# Patient Record
Sex: MALE | Race: WHITE | NOT HISPANIC OR LATINO | URBAN - METROPOLITAN AREA
[De-identification: names, ages, dates, MRNs, and addresses within clinical notes are randomized per-mention and may not be internally consistent; named-entity substitution may affect disease eponyms.]

---

## 2023-10-13 RX ORDER — DIVALPROEX SODIUM 500 MG/1
500 TABLET, DELAYED RELEASE ORAL
Refills: 0 | Status: DISCONTINUED | OUTPATIENT
Start: 2023-10-14 | End: 2023-10-25

## 2023-10-13 RX ORDER — QUETIAPINE FUMARATE 200 MG/1
200 TABLET, FILM COATED ORAL AT BEDTIME
Refills: 0 | Status: DISCONTINUED | OUTPATIENT
Start: 2023-10-14 | End: 2023-11-01

## 2023-10-13 RX ORDER — BENZTROPINE MESYLATE 1 MG
1 TABLET ORAL
Refills: 0 | Status: DISCONTINUED | OUTPATIENT
Start: 2023-10-14 | End: 2023-11-01

## 2023-10-13 RX ORDER — QUETIAPINE FUMARATE 200 MG/1
25 TABLET, FILM COATED ORAL EVERY 6 HOURS
Refills: 0 | Status: DISCONTINUED | OUTPATIENT
Start: 2023-10-14 | End: 2023-10-16

## 2023-10-13 RX ORDER — HALOPERIDOL DECANOATE 100 MG/ML
5 INJECTION INTRAMUSCULAR EVERY 6 HOURS
Refills: 0 | Status: DISCONTINUED | OUTPATIENT
Start: 2023-10-14 | End: 2023-11-01

## 2023-10-14 ENCOUNTER — INPATIENT (INPATIENT)
Facility: HOSPITAL | Age: 53
LOS: 17 days | Discharge: HOME | DRG: 885 | End: 2023-11-01
Attending: PSYCHIATRY & NEUROLOGY | Admitting: PSYCHIATRY & NEUROLOGY
Payer: MEDICARE

## 2023-10-14 VITALS
OXYGEN SATURATION: 96 % | RESPIRATION RATE: 18 BRPM | TEMPERATURE: 98 F | WEIGHT: 207.9 LBS | SYSTOLIC BLOOD PRESSURE: 131 MMHG | HEART RATE: 76 BPM | DIASTOLIC BLOOD PRESSURE: 77 MMHG | HEIGHT: 71 IN

## 2023-10-14 PROCEDURE — 90792 PSYCH DIAG EVAL W/MED SRVCS: CPT

## 2023-10-14 RX ORDER — NICOTINE POLACRILEX 2 MG
1 GUM BUCCAL DAILY
Refills: 0 | Status: DISCONTINUED | OUTPATIENT
Start: 2023-10-14 | End: 2023-11-01

## 2023-10-14 RX ADMIN — QUETIAPINE FUMARATE 200 MILLIGRAM(S): 200 TABLET, FILM COATED ORAL at 21:20

## 2023-10-14 RX ADMIN — DIVALPROEX SODIUM 500 MILLIGRAM(S): 500 TABLET, DELAYED RELEASE ORAL at 21:20

## 2023-10-14 RX ADMIN — Medication 1 MILLIGRAM(S): at 11:00

## 2023-10-14 RX ADMIN — Medication 1 MILLIGRAM(S): at 21:20

## 2023-10-14 NOTE — BH PATIENT PROFILE - HOME MEDICATIONS
No Rx/Hx Recorded valproic acid 250 mg oral capsule , 3 cap(s) orally 2 times a day  sodium chloride 1 g oral tablet , 1 tab(s) orally 2 times a day  QUEtiapine 200 mg oral tablet , 1 tab(s) orally once a day (at bedtime)  benztropine 1 mg oral tablet , 1 tab(s) orally 2 times a day

## 2023-10-14 NOTE — BH PATIENT PROFILE - FALL HARM RISK - UNIVERSAL INTERVENTIONS
Bed in lowest position, wheels locked, appropriate side rails in place/Call bell, personal items and telephone in reach/Instruct patient to call for assistance before getting out of bed or chair/Non-slip footwear when patient is out of bed/[place] to call system/Physically safe environment - no spills, clutter or unnecessary equipment/Purposeful Proactive Rounding/Room/bathroom lighting operational, light cord in reach

## 2023-10-14 NOTE — BH INPATIENT PSYCHIATRY ASSESSMENT NOTE - NSBHMETABOLIC_PSY_ALL_CORE_FT
BMI: BMI (kg/m2): 29 (10-14-23 @ 04:31)  HbA1c:   Glucose:   BP: 122/77 (10-14-23 @ 09:00) (122/77 - 131/77)  Lipid Panel:  BMI: BMI (kg/m2): 29 (10-14-23 @ 04:31)  HbA1c:   Glucose:   BP: 130/87 (10-14-23 @ 17:23) (122/77 - 131/77)  Lipid Panel:

## 2023-10-14 NOTE — BH INPATIENT PSYCHIATRY ASSESSMENT NOTE - NSBHCHARTREVIEWVS_PSY_A_CORE FT
Vital Signs Last 24 Hrs  T(C): 37.2 (10-14-23 @ 09:00), Max: 37.2 (10-14-23 @ 09:00)  T(F): 98.9 (10-14-23 @ 09:00), Max: 98.9 (10-14-23 @ 09:00)  HR: 65 (10-14-23 @ 09:00) (65 - 76)  BP: 122/77 (10-14-23 @ 09:00) (122/77 - 131/77)  BP(mean): --  RR: 17 (10-14-23 @ 09:00) (17 - 18)  SpO2: 96% (10-14-23 @ 09:00) (96% - 96%)     Vital Signs Last 24 Hrs  T(C): 37.2 (10-14-23 @ 17:23), Max: 37.2 (10-14-23 @ 09:00)  T(F): 98.9 (10-14-23 @ 17:23), Max: 98.9 (10-14-23 @ 09:00)  HR: 65 (10-14-23 @ 17:23) (65 - 76)  BP: 130/87 (10-14-23 @ 17:23) (122/77 - 131/77)  BP(mean): --  RR: 18 (10-14-23 @ 17:23) (17 - 18)  SpO2: 96% (10-14-23 @ 17:23) (96% - 96%)

## 2023-10-14 NOTE — BH INPATIENT PSYCHIATRY ASSESSMENT NOTE - NSBHPHYSICALEXAM_PSY_ALL_CORE
declined to do a physical exam, an explanation was provided on the importance of a physical exam and that if he gives consent another one can be done.

## 2023-10-14 NOTE — ED BEHAVIORAL HEALTH NOTE - BEHAVIORAL HEALTH NOTE
Patient arrived safety to [facility], was assessed on telepsych platform, denies any complaints at this time. Orders were placed by admitting team.

## 2023-10-14 NOTE — BH INPATIENT PSYCHIATRY ASSESSMENT NOTE - HPI (INCLUDE ILLNESS QUALITY, SEVERITY, DURATION, TIMING, CONTEXT, MODIFYING FACTORS, ASSOCIATED SIGNS AND SYMPTOMS)
52 yo male, domiciled at residence, followed by [facility], with PPH of schizoaffective disorder on prolixin 62.5 mg given on 10/09 per the ED collateral at [facility]. He was brought to the ED after being found going through maillboxes and demonstrating disorganized behavior out in the community. He was admitted due to decompensation. On assessment today, the patient was notably paranoid, frequently stating that he wanted to "keep those things private." He expressed that he felt the hospital was "poisoning the food in the morning." He reported having a "good appetite and good mood." He declined to comment on any other mood symptoms and also refused to mention how much sleep he has been getting recently. He denies any access to guns, denies any SI or HI. He reports smoking 2-3 cigars daily and denies all other substance use. He declined all other questions and reported mild side effects to haldol, artane, and inderal. It appears related to EPS and dizziness.

## 2023-10-14 NOTE — BH INPATIENT PSYCHIATRY ASSESSMENT NOTE - RISK ASSESSMENT
The patient was admitted due to disorganized behavior at his primary residence. He denies SI or HI and reports no history of SA. He denies any access to firearms.

## 2023-10-14 NOTE — BH INPATIENT PSYCHIATRY ASSESSMENT NOTE - CURRENT MEDICATION
MEDICATIONS  (STANDING):  benztropine 1 milliGRAM(s) Oral two times a day  divalproex  milliGRAM(s) Oral two times a day  nicotine -  14 mG/24Hr(s) Patch 1 Patch Transdermal daily  QUEtiapine 200 milliGRAM(s) Oral at bedtime    MEDICATIONS  (PRN):  haloperidol     Tablet 5 milliGRAM(s) Oral every 6 hours PRN agitation  LORazepam     Tablet 1 milliGRAM(s) Oral every 6 hours PRN Agitation  QUEtiapine 25 milliGRAM(s) Oral every 6 hours PRN agitation

## 2023-10-14 NOTE — BH INPATIENT PSYCHIATRY ASSESSMENT NOTE - NSBHASSESSSUMMFT_PSY_ALL_CORE
54 yo male, domiciled at residence, [facility] by [facility], with PPH of schizoaffective disorder on prolixin 62.5 mg given on 10/09 per the ED collateral at [facility]. He was brought to the ED after being found going through maillboxes and demonstrating disorganized behavior out in the community. 52 yo male, domiciled at residence, followed by [facility], with PPH of schizoaffective disorder on prolixin 62.5 mg given on 10/09 per the ED collateral at [facility]. He was brought to the ED after being found going through maillboxes and demonstrating disorganized behavior out in the community. On assessment today, notable symptoms included paranoia, delusional content of having his food poisoned, and mild agitation. He denies SI or HI. He declined to provide many details on assessment. He reports no other acute complaints at this time. He has not been a behavioral issue on the unit with no aggressive behaviors.     Plan:  legal status 9.27  - routine observation  - depakote  mg BID  - seroquel 200 mg nightly  - nicotine patch 14 mg   - zyprexa 5 mg PO/IM q6 hours prn for acute agitation   -

## 2023-10-15 PROCEDURE — 99231 SBSQ HOSP IP/OBS SF/LOW 25: CPT

## 2023-10-15 RX ORDER — NICOTINE POLACRILEX 2 MG
2 GUM BUCCAL
Refills: 0 | Status: DISCONTINUED | OUTPATIENT
Start: 2023-10-15 | End: 2023-11-01

## 2023-10-15 RX ADMIN — QUETIAPINE FUMARATE 200 MILLIGRAM(S): 200 TABLET, FILM COATED ORAL at 21:47

## 2023-10-15 RX ADMIN — Medication 1 MILLIGRAM(S): at 21:47

## 2023-10-15 RX ADMIN — DIVALPROEX SODIUM 500 MILLIGRAM(S): 500 TABLET, DELAYED RELEASE ORAL at 21:47

## 2023-10-15 RX ADMIN — Medication 1 MILLIGRAM(S): at 10:01

## 2023-10-15 RX ADMIN — Medication 1 PATCH: at 10:01

## 2023-10-15 RX ADMIN — DIVALPROEX SODIUM 500 MILLIGRAM(S): 500 TABLET, DELAYED RELEASE ORAL at 10:01

## 2023-10-15 NOTE — BH INPATIENT PSYCHIATRY PROGRESS NOTE - NSBHATTESTBILLING_PSY_A_CORE
54256-Gqtnnsosqc OBS or IP - moderate complexity OR 35-49 mins 31779-Xrnprgiqkf OBS or IP - low complexity OR 25-34 mins

## 2023-10-15 NOTE — BH INPATIENT PSYCHIATRY PROGRESS NOTE - PRN MEDS
MEDICATIONS  (PRN):  haloperidol     Tablet 5 milliGRAM(s) Oral every 6 hours PRN agitation  LORazepam     Tablet 1 milliGRAM(s) Oral every 6 hours PRN Agitation  QUEtiapine 25 milliGRAM(s) Oral every 6 hours PRN agitation

## 2023-10-15 NOTE — BH INPATIENT PSYCHIATRY PROGRESS NOTE - NSBHASSESSSUMMFT_PSY_ALL_CORE
52 yo male, domiciled at residence, [facility], with PPH of schizoaffective disorder on prolixin 62.5 mg given on 10/09 per the ED collateral at [facility]. He was brought to the ED after being found going through maillboxes and demonstrating disorganized behavior out in the community. On assessment today, notable symptoms included paranoia, delusional content of having his food poisoned, and mild agitation. He denies SI or HI. He declined to provide many details on assessment. He reports no other acute complaints at this time. He has not been a behavioral issue on the unit with no aggressive behaviors.     Plan:  legal status 9.27  - routine observation  - depakote  mg BID  - seroquel 200 mg nightly  - nicotine patch 14 mg   - zyprexa 5 mg PO/IM q6 hours prn for acute agitation   -  52 yo male, domiciled at residence, followed by [facility], with PPH of schizoaffective disorder on prolixin 62.5 mg given on 10/09 per the ED collateral at [facility]. He was brought to the ED after being found going through maillboxes and demonstrating disorganized behavior out in the community. On assessment today, notable symptoms included intermittent auditory hallucinations and, yesterday, the patient reported paranoia and delusional content including the belief that his food was poisoned, as well as mild agitation. He persistently denies SI/HI, intent and plan. He declined to provide any details on further assessment. He reports no other acute complaints at this time. He has not been a behavioral issue on the unit with no aggressive behaviors.     Plan:  legal status 9.27  - routine observation  - depakote  mg BID  - seroquel 200 mg nightly  - nicotine patch 14 mg   - zyprexa 5 mg PO/IM q6 hours prn for acute agitation

## 2023-10-15 NOTE — BH INPATIENT PSYCHIATRY PROGRESS NOTE - NSBHMETABOLIC_PSY_ALL_CORE_FT
BMI: BMI (kg/m2): 29 (10-14-23 @ 04:31)  HbA1c:   Glucose:   BP: 130/87 (10-14-23 @ 17:23) (122/77 - 131/77)  Lipid Panel:  BMI: BMI (kg/m2): 29 (10-14-23 @ 04:31)  HbA1c:   Glucose:   BP: 143/97 (10-15-23 @ 16:58) (122/77 - 143/97)  Lipid Panel:

## 2023-10-15 NOTE — BH INPATIENT PSYCHIATRY PROGRESS NOTE - NSBHCHARTREVIEWVS_PSY_A_CORE FT
Vital Signs Last 24 Hrs  T(C): 37.2 (10-14-23 @ 17:23), Max: 37.2 (10-14-23 @ 09:00)  T(F): 98.9 (10-14-23 @ 17:23), Max: 98.9 (10-14-23 @ 09:00)  HR: 65 (10-14-23 @ 17:23) (65 - 65)  BP: 130/87 (10-14-23 @ 17:23) (122/77 - 130/87)  BP(mean): --  RR: 18 (10-14-23 @ 17:23) (17 - 18)  SpO2: 96% (10-14-23 @ 17:23) (96% - 96%)     Vital Signs Last 24 Hrs  T(C): 37.5 (10-15-23 @ 16:58), Max: 37.6 (10-15-23 @ 08:46)  T(F): 99.5 (10-15-23 @ 16:58), Max: 99.6 (10-15-23 @ 08:46)  HR: 72 (10-15-23 @ 16:58) (71 - 72)  BP: 143/97 (10-15-23 @ 16:58) (140/91 - 143/97)  BP(mean): --  RR: 16 (10-15-23 @ 16:58) (16 - 16)  SpO2: 99% (10-15-23 @ 16:58) (98% - 99%)

## 2023-10-15 NOTE — BH INPATIENT PSYCHIATRY PROGRESS NOTE - NSBHFUPINTERVALHXFT_PSY_A_CORE
Chart and nursing notes reviewed.  No acute events overnight, VSS.  The patient has been interacting appropriately with staff and peers and has been compliant with medications.  The patient continues to tolerate medications and denies any medication side effects.    On evaluation, the patient is calm, cooperative, demonstrating good behavioral control and linear thought processes.   Chart and nursing notes reviewed.  No acute events overnight, VSS.  The patient has been interacting appropriately with staff and peers and has been compliant with medications.  The patient continues to tolerate medications and denies any medication side effects.    On evaluation, the patient is calm, cooperative, oddly related with good eye contact, euthymic affect and demonstrating good behavioral control and linear thought processes.  The patient reports that he "feel[s] fine."  He inquires about obtaining a comb and his clothing and the patient is directed to nursing.  He requests a nicotine lozenge for NRT.  The patient denies SI/HI, intent, plan and AVH.  He reports intermittent AH "when they are trying to trouble him."  He reports that he last experienced AH this morning but he declines to elaborate.  The patient reports all needs have been met.  All questions and concerns addressed.

## 2023-10-16 PROCEDURE — 99233 SBSQ HOSP IP/OBS HIGH 50: CPT

## 2023-10-16 RX ADMIN — Medication 2 MILLIGRAM(S): at 12:38

## 2023-10-16 RX ADMIN — Medication 1 PATCH: at 06:42

## 2023-10-16 RX ADMIN — Medication 1 MILLIGRAM(S): at 10:12

## 2023-10-16 RX ADMIN — Medication 2 MILLIGRAM(S): at 10:14

## 2023-10-16 RX ADMIN — DIVALPROEX SODIUM 500 MILLIGRAM(S): 500 TABLET, DELAYED RELEASE ORAL at 21:24

## 2023-10-16 RX ADMIN — Medication 2 MILLIGRAM(S): at 17:27

## 2023-10-16 RX ADMIN — QUETIAPINE FUMARATE 200 MILLIGRAM(S): 200 TABLET, FILM COATED ORAL at 21:24

## 2023-10-16 RX ADMIN — Medication 1 MILLIGRAM(S): at 21:24

## 2023-10-16 RX ADMIN — DIVALPROEX SODIUM 500 MILLIGRAM(S): 500 TABLET, DELAYED RELEASE ORAL at 10:12

## 2023-10-16 NOTE — BH INPATIENT PSYCHIATRY PROGRESS NOTE - NSBHCHARTREVIEWVS_PSY_A_CORE FT
Vital Signs Last 24 Hrs  T(C): 36.7 (10-16-23 @ 08:55), Max: 37.5 (10-15-23 @ 16:58)  T(F): 98 (10-16-23 @ 08:55), Max: 99.5 (10-15-23 @ 16:58)  HR: 80 (10-16-23 @ 08:55) (72 - 80)  BP: 144/85 (10-16-23 @ 08:55) (143/97 - 144/85)  RR: 18 (10-16-23 @ 08:55) (16 - 18)  SpO2: 98% (10-16-23 @ 08:55) (98% - 99%)     Vital Signs Last 24 Hrs  T(C): --  T(F): --  HR: --  BP: --  BP(mean): --  RR: --  SpO2: --

## 2023-10-16 NOTE — BH SOCIAL WORK INITIAL PSYCHOSOCIAL EVALUATION - OTHER PAST PSYCHIATRIC HISTORY (INCLUDE DETAILS REGARDING ONSET, COURSE OF ILLNESS, INPATIENT/OUTPATIENT TREATMENT)
52 yo male, domiciled at residence, [facility] by [facility], with PPH of schizoaffective disorder on prolixin 62.5 mg given on 10/09 per the ED collateral at [facility]. He was brought to the ED after being found going through maillboxes and demonstrating disorganized behavior out in the community. On assessment today, notable symptoms included intermittent auditory hallucinations and, yesterday, the patient reported paranoia and delusional content including the belief that his food was poisoned, as well as mild agitation. He persistently denies SI/HI, intent and plan. He declined to provide any details on further assessment. He reports no other acute complaints at this time. He has not been a behavioral issue on the unit with no aggressive behaviors.

## 2023-10-16 NOTE — BH INPATIENT PSYCHIATRY PROGRESS NOTE - NSBHATTESTBILLING_PSY_A_CORE
55168-Ggfvoiccns OBS or IP - low complexity OR 25-34 mins 17111-Mtlhwgnkkd OBS or IP - high complexity OR 50-79 mins

## 2023-10-16 NOTE — BH INPATIENT PSYCHIATRY PROGRESS NOTE - NSBHFUPINTERVALHXFT_PSY_A_CORE
Chart and nursing notes reviewed. No acute events overnight. No new complaints. On evaluation today patient was calm, cooperative and pleasant. He has been adherent to standing medications. Oddly related, endorsed psychotic content including delusions, and his thought process was disorganized. He stated that he was not sure why he was hospitalized, but that he believed it had something to do with his psychiatrist being a "terrorist." When asked if patient was hearing any voices he responded "all the time" but did not elaborate further when asked. He denied any suicidal or homicidal ideation, intent, or plan. Patient confirmed he had an ACT team, did not remember the name but stated he was unhappy with them because he is trying to "get off of prolixin and get off of the shots." He stated he was pleased with current standing medications, but asked if writer could "separate the various components of the medications" when asked to clarify he responded "you're not a real doctor are you? you're just an MD." He then stopped engaging with writer and walked away.     Will attempt to call ACT team, as per chart review, patient last received Prolixin KIDD on 10/9/23

## 2023-10-16 NOTE — BH INPATIENT PSYCHIATRY PROGRESS NOTE - PRN MEDS
MEDICATIONS  (PRN):  haloperidol     Tablet 5 milliGRAM(s) Oral every 6 hours PRN agitation  LORazepam     Tablet 1 milliGRAM(s) Oral every 6 hours PRN Agitation  nicotine  Polacrilex Lozenge 2 milliGRAM(s) Oral every 2 hours PRN NRT  QUEtiapine 25 milliGRAM(s) Oral every 6 hours PRN agitation   MEDICATIONS  (PRN):

## 2023-10-16 NOTE — BH INPATIENT PSYCHIATRY PROGRESS NOTE - CURRENT MEDICATION
MEDICATIONS  (STANDING):  benztropine 1 milliGRAM(s) Oral two times a day  divalproex  milliGRAM(s) Oral two times a day  nicotine -  14 mG/24Hr(s) Patch 1 Patch Transdermal daily  QUEtiapine 200 milliGRAM(s) Oral at bedtime    MEDICATIONS  (PRN):  haloperidol     Tablet 5 milliGRAM(s) Oral every 6 hours PRN agitation  LORazepam     Tablet 1 milliGRAM(s) Oral every 6 hours PRN Agitation  nicotine  Polacrilex Lozenge 2 milliGRAM(s) Oral every 2 hours PRN NRT  QUEtiapine 25 milliGRAM(s) Oral every 6 hours PRN agitation   MEDICATIONS  (STANDING):    MEDICATIONS  (PRN):

## 2023-10-16 NOTE — BH INPATIENT PSYCHIATRY PROGRESS NOTE - NSBHMETABOLIC_PSY_ALL_CORE_FT
BMI: BMI (kg/m2): 29 (10-14-23 @ 04:31)  HbA1c: ordered   Glucose: ordered   BP: 144/85 (10-16-23 @ 08:55) (122/77 - 144/85)  Lipid Panel: ordered  BMI: BMI (kg/m2): 29 (10-14-23 @ 04:31)  HbA1c: A1C with Estimated Average Glucose Result: 5.2 % (10-17-23 @ 11:13)    Glucose:   BP: 117/79 (11-01-23 @ 08:46) (117/79 - 152/86)  Lipid Panel: Date/Time: 10-17-23 @ 11:13  Cholesterol, Serum: 165  Direct LDL: --  HDL Cholesterol, Serum: 58  Total Cholesterol/HDL Ration Measurement: --  Triglycerides, Serum: 93

## 2023-10-17 LAB
A1C WITH ESTIMATED AVERAGE GLUCOSE RESULT: 5.2 % — SIGNIFICANT CHANGE UP (ref 4–5.6)
A1C WITH ESTIMATED AVERAGE GLUCOSE RESULT: 5.2 % — SIGNIFICANT CHANGE UP (ref 4–5.6)
ALBUMIN SERPL ELPH-MCNC: 4.4 G/DL — SIGNIFICANT CHANGE UP (ref 3.3–5)
ALBUMIN SERPL ELPH-MCNC: 4.4 G/DL — SIGNIFICANT CHANGE UP (ref 3.3–5)
ALP SERPL-CCNC: 56 U/L — SIGNIFICANT CHANGE UP (ref 40–120)
ALP SERPL-CCNC: 56 U/L — SIGNIFICANT CHANGE UP (ref 40–120)
ALT FLD-CCNC: 21 U/L — SIGNIFICANT CHANGE UP (ref 10–45)
ALT FLD-CCNC: 21 U/L — SIGNIFICANT CHANGE UP (ref 10–45)
ANION GAP SERPL CALC-SCNC: 10 MMOL/L — SIGNIFICANT CHANGE UP (ref 5–17)
ANION GAP SERPL CALC-SCNC: 10 MMOL/L — SIGNIFICANT CHANGE UP (ref 5–17)
AST SERPL-CCNC: 23 U/L — SIGNIFICANT CHANGE UP (ref 10–40)
AST SERPL-CCNC: 23 U/L — SIGNIFICANT CHANGE UP (ref 10–40)
BILIRUB SERPL-MCNC: 0.4 MG/DL — SIGNIFICANT CHANGE UP (ref 0.2–1.2)
BILIRUB SERPL-MCNC: 0.4 MG/DL — SIGNIFICANT CHANGE UP (ref 0.2–1.2)
BUN SERPL-MCNC: 12 MG/DL — SIGNIFICANT CHANGE UP (ref 7–23)
BUN SERPL-MCNC: 12 MG/DL — SIGNIFICANT CHANGE UP (ref 7–23)
CALCIUM SERPL-MCNC: 10 MG/DL — SIGNIFICANT CHANGE UP (ref 8.4–10.5)
CALCIUM SERPL-MCNC: 10 MG/DL — SIGNIFICANT CHANGE UP (ref 8.4–10.5)
CHLORIDE SERPL-SCNC: 92 MMOL/L — LOW (ref 96–108)
CHLORIDE SERPL-SCNC: 92 MMOL/L — LOW (ref 96–108)
CHOLEST SERPL-MCNC: 165 MG/DL — SIGNIFICANT CHANGE UP
CHOLEST SERPL-MCNC: 165 MG/DL — SIGNIFICANT CHANGE UP
CO2 SERPL-SCNC: 28 MMOL/L — SIGNIFICANT CHANGE UP (ref 22–31)
CO2 SERPL-SCNC: 28 MMOL/L — SIGNIFICANT CHANGE UP (ref 22–31)
CREAT SERPL-MCNC: 0.72 MG/DL — SIGNIFICANT CHANGE UP (ref 0.5–1.3)
CREAT SERPL-MCNC: 0.72 MG/DL — SIGNIFICANT CHANGE UP (ref 0.5–1.3)
EGFR: 111 ML/MIN/1.73M2 — SIGNIFICANT CHANGE UP
EGFR: 111 ML/MIN/1.73M2 — SIGNIFICANT CHANGE UP
ESTIMATED AVERAGE GLUCOSE: 103 MG/DL — SIGNIFICANT CHANGE UP (ref 68–114)
ESTIMATED AVERAGE GLUCOSE: 103 MG/DL — SIGNIFICANT CHANGE UP (ref 68–114)
GLUCOSE SERPL-MCNC: 104 MG/DL — HIGH (ref 70–99)
GLUCOSE SERPL-MCNC: 104 MG/DL — HIGH (ref 70–99)
HCT VFR BLD CALC: 46.4 % — SIGNIFICANT CHANGE UP (ref 39–50)
HCT VFR BLD CALC: 46.4 % — SIGNIFICANT CHANGE UP (ref 39–50)
HDLC SERPL-MCNC: 58 MG/DL — SIGNIFICANT CHANGE UP
HDLC SERPL-MCNC: 58 MG/DL — SIGNIFICANT CHANGE UP
HGB BLD-MCNC: 15.8 G/DL — SIGNIFICANT CHANGE UP (ref 13–17)
HGB BLD-MCNC: 15.8 G/DL — SIGNIFICANT CHANGE UP (ref 13–17)
LIPID PNL WITH DIRECT LDL SERPL: 88 MG/DL — SIGNIFICANT CHANGE UP
LIPID PNL WITH DIRECT LDL SERPL: 88 MG/DL — SIGNIFICANT CHANGE UP
MCHC RBC-ENTMCNC: 33 PG — SIGNIFICANT CHANGE UP (ref 27–34)
MCHC RBC-ENTMCNC: 33 PG — SIGNIFICANT CHANGE UP (ref 27–34)
MCHC RBC-ENTMCNC: 34.1 GM/DL — SIGNIFICANT CHANGE UP (ref 32–36)
MCHC RBC-ENTMCNC: 34.1 GM/DL — SIGNIFICANT CHANGE UP (ref 32–36)
MCV RBC AUTO: 96.9 FL — SIGNIFICANT CHANGE UP (ref 80–100)
MCV RBC AUTO: 96.9 FL — SIGNIFICANT CHANGE UP (ref 80–100)
NON HDL CHOLESTEROL: 107 MG/DL — SIGNIFICANT CHANGE UP
NON HDL CHOLESTEROL: 107 MG/DL — SIGNIFICANT CHANGE UP
NRBC # BLD: 0 /100 WBCS — SIGNIFICANT CHANGE UP (ref 0–0)
NRBC # BLD: 0 /100 WBCS — SIGNIFICANT CHANGE UP (ref 0–0)
PLATELET # BLD AUTO: 317 K/UL — SIGNIFICANT CHANGE UP (ref 150–400)
PLATELET # BLD AUTO: 317 K/UL — SIGNIFICANT CHANGE UP (ref 150–400)
POTASSIUM SERPL-MCNC: 4.6 MMOL/L — SIGNIFICANT CHANGE UP (ref 3.5–5.3)
POTASSIUM SERPL-MCNC: 4.6 MMOL/L — SIGNIFICANT CHANGE UP (ref 3.5–5.3)
POTASSIUM SERPL-SCNC: 4.6 MMOL/L — SIGNIFICANT CHANGE UP (ref 3.5–5.3)
POTASSIUM SERPL-SCNC: 4.6 MMOL/L — SIGNIFICANT CHANGE UP (ref 3.5–5.3)
PROT SERPL-MCNC: 7.8 G/DL — SIGNIFICANT CHANGE UP (ref 6–8.3)
PROT SERPL-MCNC: 7.8 G/DL — SIGNIFICANT CHANGE UP (ref 6–8.3)
RBC # BLD: 4.79 M/UL — SIGNIFICANT CHANGE UP (ref 4.2–5.8)
RBC # BLD: 4.79 M/UL — SIGNIFICANT CHANGE UP (ref 4.2–5.8)
RBC # FLD: 13.1 % — SIGNIFICANT CHANGE UP (ref 10.3–14.5)
RBC # FLD: 13.1 % — SIGNIFICANT CHANGE UP (ref 10.3–14.5)
SODIUM SERPL-SCNC: 130 MMOL/L — LOW (ref 135–145)
SODIUM SERPL-SCNC: 130 MMOL/L — LOW (ref 135–145)
TRIGL SERPL-MCNC: 93 MG/DL — SIGNIFICANT CHANGE UP
TRIGL SERPL-MCNC: 93 MG/DL — SIGNIFICANT CHANGE UP
WBC # BLD: 8.33 K/UL — SIGNIFICANT CHANGE UP (ref 3.8–10.5)
WBC # BLD: 8.33 K/UL — SIGNIFICANT CHANGE UP (ref 3.8–10.5)
WBC # FLD AUTO: 8.33 K/UL — SIGNIFICANT CHANGE UP (ref 3.8–10.5)
WBC # FLD AUTO: 8.33 K/UL — SIGNIFICANT CHANGE UP (ref 3.8–10.5)

## 2023-10-17 PROCEDURE — 99232 SBSQ HOSP IP/OBS MODERATE 35: CPT

## 2023-10-17 RX ADMIN — Medication 1 MILLIGRAM(S): at 10:06

## 2023-10-17 RX ADMIN — Medication 2 MILLIGRAM(S): at 10:07

## 2023-10-17 RX ADMIN — DIVALPROEX SODIUM 500 MILLIGRAM(S): 500 TABLET, DELAYED RELEASE ORAL at 21:07

## 2023-10-17 RX ADMIN — QUETIAPINE FUMARATE 200 MILLIGRAM(S): 200 TABLET, FILM COATED ORAL at 21:07

## 2023-10-17 RX ADMIN — DIVALPROEX SODIUM 500 MILLIGRAM(S): 500 TABLET, DELAYED RELEASE ORAL at 10:05

## 2023-10-17 RX ADMIN — Medication 2 MILLIGRAM(S): at 12:32

## 2023-10-17 RX ADMIN — Medication 2 MILLIGRAM(S): at 21:09

## 2023-10-17 RX ADMIN — Medication 2 MILLIGRAM(S): at 16:45

## 2023-10-17 RX ADMIN — Medication 1 MILLIGRAM(S): at 21:07

## 2023-10-17 NOTE — BH INPATIENT PSYCHIATRY PROGRESS NOTE - NSBHASSESSSUMMFT_PSY_ALL_CORE
52 yo male, domiciled at residence, followed by [facility], with PPH of schizoaffective disorder on prolixin 62.5 mg given on 10/09 per the ED collateral at [facility]. He was brought to the ED after being found going through maillboxes and demonstrating disorganized behavior out in the community. On assessment today, notable symptoms included intermittent auditory hallucinations and, yesterday, the patient reported paranoia and delusional content including the belief that his food was poisoned, as well as mild agitation. He persistently denies SI/HI, intent and plan. He declined to provide any details on further assessment. He reports no other acute complaints at this time. He has not been a behavioral issue on the unit with no aggressive behaviors.     Plan:  # Schizoaffective disorder, bipolar type   - Continue with Depakote  mg orally BID  - Continue with Seroquel 200 mg orally nightly  - Continue with Cogentin 1 mg orally BID for EPS/acute dystonia   - Zprexa/Ativan orally PRN for agitation and anxiety respectively   - Legal status 9.27     #Nicotine dependence   - Continue with 14 mg patch q24H

## 2023-10-17 NOTE — BH INPATIENT PSYCHIATRY PROGRESS NOTE - NS ED BHA REVIEW OF ED CHART AVAILABLE INVESTIGATIONS REVIEWED
Ears: no ear pain and no hearing problems. Nose: no nasal congestion and no nasal drainage. Mouth/Throat: no dysphagia, no hoarseness and no throat pain. Neck: no lumps, no pain, no stiffness and no swollen glands. None available

## 2023-10-17 NOTE — BH INPATIENT PSYCHIATRY PROGRESS NOTE - CURRENT MEDICATION
MEDICATIONS  (STANDING):  benztropine 1 milliGRAM(s) Oral two times a day  divalproex  milliGRAM(s) Oral two times a day  nicotine -  14 mG/24Hr(s) Patch 1 Patch Transdermal daily  QUEtiapine 200 milliGRAM(s) Oral at bedtime    MEDICATIONS  (PRN):  haloperidol     Tablet 5 milliGRAM(s) Oral every 6 hours PRN agitation  LORazepam     Tablet 1 milliGRAM(s) Oral every 6 hours PRN Agitation  nicotine  Polacrilex Lozenge 2 milliGRAM(s) Oral every 2 hours PRN NRT   MEDICATIONS  (STANDING):    MEDICATIONS  (PRN):

## 2023-10-17 NOTE — BH INPATIENT PSYCHIATRY PROGRESS NOTE - NSBHMETABOLIC_PSY_ALL_CORE_FT
BMI: BMI (kg/m2): 29 (10-14-23 @ 04:31)  HbA1c: A1C with Estimated Average Glucose Result: 5.2 % (10-17-23 @ 11:13)  Glucose: 104  BP: 128/89 (10-17-23 @ 08:55) (128/89 - 144/85)  Lipid Panel: Date/Time: 10-17-23 @ 11:13  Cholesterol, Serum: 165  Direct LDL: 88  HDL Cholesterol, Serum: 58  Triglycerides, Serum: 93   BMI: BMI (kg/m2): 29 (10-14-23 @ 04:31)  HbA1c: A1C with Estimated Average Glucose Result: 5.2 % (10-17-23 @ 11:13)    Glucose:   BP: 117/79 (11-01-23 @ 08:46) (117/79 - 152/86)  Lipid Panel: Date/Time: 10-17-23 @ 11:13  Cholesterol, Serum: 165  Direct LDL: --  HDL Cholesterol, Serum: 58  Total Cholesterol/HDL Ration Measurement: --  Triglycerides, Serum: 93

## 2023-10-17 NOTE — BH INPATIENT PSYCHIATRY PROGRESS NOTE - PRN MEDS
MEDICATIONS  (PRN):  haloperidol     Tablet 5 milliGRAM(s) Oral every 6 hours PRN agitation  LORazepam     Tablet 1 milliGRAM(s) Oral every 6 hours PRN Agitation  nicotine  Polacrilex Lozenge 2 milliGRAM(s) Oral every 2 hours PRN NRT   MEDICATIONS  (PRN):

## 2023-10-17 NOTE — BH INPATIENT PSYCHIATRY PROGRESS NOTE - NSBHCHARTREVIEWVS_PSY_A_CORE FT
Vital Signs Last 24 Hrs  T(C): 37.2 (10-17-23 @ 08:55), Max: 37.2 (10-17-23 @ 08:55)  T(F): 98.9 (10-17-23 @ 08:55), Max: 98.9 (10-17-23 @ 08:55)  HR: 81 (10-17-23 @ 08:55) (81 - 81)  BP: 128/89 (10-17-23 @ 08:55) (128/89 - 128/89)  RR: 18 (10-17-23 @ 08:55) (18 - 18)  SpO2: 95% (10-17-23 @ 08:55) (95% - 95%)     Vital Signs Last 24 Hrs  T(C): --  T(F): --  HR: --  BP: --  BP(mean): --  RR: --  SpO2: --

## 2023-10-17 NOTE — BH INPATIENT PSYCHIATRY PROGRESS NOTE - NSBHFUPINTERVALHXFT_PSY_A_CORE
Chart and nursing notes reviewed. No acute events overnight. No new complaints. On evaluation today patient was calm, cooperative and pleasant. Better related, adherent to all standing medications. Depakote level and labs ordered for tomorrow. Some chronic paranoia and disorganization of thought process present, however was able to engage with writer and ask about possibility of switching Prolixin KIDD. Writer informed patient he needed to speak to his ACT team and confirm. Endorses he "sometimes" hears voices. Denies any visual hallucinations, denied any suicidal or homicidal ideation, intent, or plan. Noted to be in hallway, in good behavioral control, at times internally preoccupied. Calm and cooperative with staff and fellow peers.     Writer attempted to contact ACT team, late in day, will re-attempt. As per ACT team patient received Prolixin 62.5 mg KIDD on 10/10/23

## 2023-10-18 LAB
ALBUMIN SERPL ELPH-MCNC: 4 G/DL — SIGNIFICANT CHANGE UP (ref 3.3–5)
ALBUMIN SERPL ELPH-MCNC: 4 G/DL — SIGNIFICANT CHANGE UP (ref 3.3–5)
ALP SERPL-CCNC: 50 U/L — SIGNIFICANT CHANGE UP (ref 40–120)
ALP SERPL-CCNC: 50 U/L — SIGNIFICANT CHANGE UP (ref 40–120)
ALT FLD-CCNC: 18 U/L — SIGNIFICANT CHANGE UP (ref 10–45)
ALT FLD-CCNC: 18 U/L — SIGNIFICANT CHANGE UP (ref 10–45)
ANION GAP SERPL CALC-SCNC: 9 MMOL/L — SIGNIFICANT CHANGE UP (ref 5–17)
ANION GAP SERPL CALC-SCNC: 9 MMOL/L — SIGNIFICANT CHANGE UP (ref 5–17)
AST SERPL-CCNC: 21 U/L — SIGNIFICANT CHANGE UP (ref 10–40)
AST SERPL-CCNC: 21 U/L — SIGNIFICANT CHANGE UP (ref 10–40)
BILIRUB SERPL-MCNC: 0.5 MG/DL — SIGNIFICANT CHANGE UP (ref 0.2–1.2)
BILIRUB SERPL-MCNC: 0.5 MG/DL — SIGNIFICANT CHANGE UP (ref 0.2–1.2)
BUN SERPL-MCNC: 13 MG/DL — SIGNIFICANT CHANGE UP (ref 7–23)
BUN SERPL-MCNC: 13 MG/DL — SIGNIFICANT CHANGE UP (ref 7–23)
CALCIUM SERPL-MCNC: 9.2 MG/DL — SIGNIFICANT CHANGE UP (ref 8.4–10.5)
CALCIUM SERPL-MCNC: 9.2 MG/DL — SIGNIFICANT CHANGE UP (ref 8.4–10.5)
CHLORIDE SERPL-SCNC: 94 MMOL/L — LOW (ref 96–108)
CHLORIDE SERPL-SCNC: 94 MMOL/L — LOW (ref 96–108)
CO2 SERPL-SCNC: 27 MMOL/L — SIGNIFICANT CHANGE UP (ref 22–31)
CO2 SERPL-SCNC: 27 MMOL/L — SIGNIFICANT CHANGE UP (ref 22–31)
CREAT SERPL-MCNC: 0.54 MG/DL — SIGNIFICANT CHANGE UP (ref 0.5–1.3)
CREAT SERPL-MCNC: 0.54 MG/DL — SIGNIFICANT CHANGE UP (ref 0.5–1.3)
EGFR: 121 ML/MIN/1.73M2 — SIGNIFICANT CHANGE UP
EGFR: 121 ML/MIN/1.73M2 — SIGNIFICANT CHANGE UP
GLUCOSE SERPL-MCNC: 88 MG/DL — SIGNIFICANT CHANGE UP (ref 70–99)
GLUCOSE SERPL-MCNC: 88 MG/DL — SIGNIFICANT CHANGE UP (ref 70–99)
POTASSIUM SERPL-MCNC: 5.1 MMOL/L — SIGNIFICANT CHANGE UP (ref 3.5–5.3)
POTASSIUM SERPL-MCNC: 5.1 MMOL/L — SIGNIFICANT CHANGE UP (ref 3.5–5.3)
POTASSIUM SERPL-SCNC: 5.1 MMOL/L — SIGNIFICANT CHANGE UP (ref 3.5–5.3)
POTASSIUM SERPL-SCNC: 5.1 MMOL/L — SIGNIFICANT CHANGE UP (ref 3.5–5.3)
PROT SERPL-MCNC: 6.7 G/DL — SIGNIFICANT CHANGE UP (ref 6–8.3)
PROT SERPL-MCNC: 6.7 G/DL — SIGNIFICANT CHANGE UP (ref 6–8.3)
SODIUM SERPL-SCNC: 130 MMOL/L — LOW (ref 135–145)
SODIUM SERPL-SCNC: 130 MMOL/L — LOW (ref 135–145)

## 2023-10-18 PROCEDURE — 99232 SBSQ HOSP IP/OBS MODERATE 35: CPT

## 2023-10-18 RX ORDER — SODIUM CHLORIDE 9 MG/ML
1 INJECTION INTRAMUSCULAR; INTRAVENOUS; SUBCUTANEOUS DAILY
Refills: 0 | Status: DISCONTINUED | OUTPATIENT
Start: 2023-10-18 | End: 2023-10-30

## 2023-10-18 RX ADMIN — Medication 1 MILLIGRAM(S): at 21:13

## 2023-10-18 RX ADMIN — Medication 2 MILLIGRAM(S): at 18:18

## 2023-10-18 RX ADMIN — Medication 2 MILLIGRAM(S): at 23:16

## 2023-10-18 RX ADMIN — DIVALPROEX SODIUM 500 MILLIGRAM(S): 500 TABLET, DELAYED RELEASE ORAL at 21:13

## 2023-10-18 RX ADMIN — DIVALPROEX SODIUM 500 MILLIGRAM(S): 500 TABLET, DELAYED RELEASE ORAL at 10:19

## 2023-10-18 RX ADMIN — Medication 2 MILLIGRAM(S): at 13:24

## 2023-10-18 RX ADMIN — QUETIAPINE FUMARATE 200 MILLIGRAM(S): 200 TABLET, FILM COATED ORAL at 21:13

## 2023-10-18 RX ADMIN — Medication 1 MILLIGRAM(S): at 10:19

## 2023-10-18 NOTE — BH INPATIENT PSYCHIATRY PROGRESS NOTE - CURRENT MEDICATION
MEDICATIONS  (STANDING):  benztropine 1 milliGRAM(s) Oral two times a day  divalproex  milliGRAM(s) Oral two times a day  nicotine -  14 mG/24Hr(s) Patch 1 Patch Transdermal daily  QUEtiapine 200 milliGRAM(s) Oral at bedtime  sodium chloride 1 Gram(s) Oral daily    MEDICATIONS  (PRN):  haloperidol     Tablet 5 milliGRAM(s) Oral every 6 hours PRN agitation  LORazepam     Tablet 1 milliGRAM(s) Oral every 6 hours PRN Agitation  nicotine  Polacrilex Lozenge 2 milliGRAM(s) Oral every 2 hours PRN NRT   MEDICATIONS  (STANDING):    MEDICATIONS  (PRN):

## 2023-10-18 NOTE — BH INPATIENT PSYCHIATRY PROGRESS NOTE - NSBHFUPINTERVALHXFT_PSY_A_CORE
Chart and nursing notes reviewed. No acute events overnight. No new complaints. On evaluation today patient was calm, cooperative and pleasant. Adherent to standing medications. Will order depakote level for Friday. Continues to be disorganized at times in his thought process/content, chronic paranoia and delusions present, but maintaining good behavioral control. Patient asked writer if ACT team had been reached, and writer explained that several attempts were made throughout the day. Continues to endorse occasional voices, smiles inappropriately at times, noted to be internally preoccupied, visible on the unit. Denies any visual hallucinations, denied any suicidal or homicidal ideation, intent, or plan. Calm and cooperative with staff and fellow peers.     Patient also started on 1g sodium chloride, for chronic asymptomatic hyponatremia which was provided by his ACT team in an email.     Writer contacted [facility] [employer] [facility] at [phone]. Spoke to  who then transferred writer to the [facility] main office at [phone], was subsequently provided with contact info for patient's provider, Shmuel Johnson ([phone]) at which point there was no answer, and voicemail was left, with call-back info. Will continue to re-attempt. As per ACT team's previous email, patient received Prolixin 62.5 mg KIDD on 10/10/23, he is next due on 10/23/23.

## 2023-10-18 NOTE — BH INPATIENT PSYCHIATRY PROGRESS NOTE - NSBHCHARTREVIEWVS_PSY_A_CORE FT
Vital Signs Last 24 Hrs  T(C): 37.1 (10-18-23 @ 08:19), Max: 37.1 (10-18-23 @ 08:19)  T(F): 98.8 (10-18-23 @ 08:19), Max: 98.8 (10-18-23 @ 08:19)  HR: 76 (10-18-23 @ 08:19) (76 - 76)  BP: 141/93 (10-18-23 @ 08:19) (141/93 - 157/98)  BP(mean): --  RR: 18 (10-18-23 @ 08:19) (18 - 18)  SpO2: 98% (10-18-23 @ 08:19) (97% - 98%)     Vital Signs Last 24 Hrs  T(C): --  T(F): --  HR: --  BP: --  BP(mean): --  RR: --  SpO2: --

## 2023-10-18 NOTE — BH INPATIENT PSYCHIATRY PROGRESS NOTE - NSBHATTESTBILLING_PSY_A_CORE
69605-Fnevpoplha OBS or IP - low complexity OR 25-34 mins 63695-Esdknzraot OBS or IP - moderate complexity OR 35-49 mins

## 2023-10-18 NOTE — BH INPATIENT PSYCHIATRY PROGRESS NOTE - NSBHMETABOLIC_PSY_ALL_CORE_FT
BMI: BMI (kg/m2): 29 (10-14-23 @ 04:31)  HbA1c: A1C with Estimated Average Glucose Result: 5.2 % (10-17-23 @ 11:13)  Glucose: 88  BP: 141/93 (10-18-23 @ 08:19) (128/89 - 157/98)  Lipid Panel: Date/Time: 10-17-23 @ 11:13  Cholesterol, Serum: 165  Direct LDL: 88  HDL Cholesterol, Serum: 58  Triglycerides, Serum: 93   BMI: BMI (kg/m2): 29 (10-14-23 @ 04:31)  HbA1c: A1C with Estimated Average Glucose Result: 5.2 % (10-17-23 @ 11:13)    Glucose:   BP: 117/79 (11-01-23 @ 08:46) (117/79 - 152/86)  Lipid Panel: Date/Time: 10-17-23 @ 11:13  Cholesterol, Serum: 165  Direct LDL: --  HDL Cholesterol, Serum: 58  Total Cholesterol/HDL Ration Measurement: --  Triglycerides, Serum: 93

## 2023-10-18 NOTE — BH INPATIENT PSYCHIATRY PROGRESS NOTE - NSBHASSESSSUMMFT_PSY_ALL_CORE
54 yo male, domiciled at residence, followed by [facility], with PPH of schizoaffective disorder on prolixin 62.5 mg given on 10/09 per the ED collateral at [facility]. He was brought to the ED after being found going through maillboxes and demonstrating disorganized behavior out in the community. On assessment today, notable symptoms included intermittent auditory hallucinations and, yesterday, the patient reported paranoia and delusional content including the belief that his food was poisoned, as well as mild agitation. He persistently denies SI/HI, intent and plan. He declined to provide any details on further assessment. He reports no other acute complaints at this time. He has not been a behavioral issue on the unit with no aggressive behaviors.     Plan:  # Schizoaffective disorder, bipolar type   - Continue with Depakote  mg orally BID  - Continue with Seroquel 200 mg orally nightly  - Continue with Cogentin 1 mg orally BID for EPS/acute dystonia   - Zprexa/Ativan orally PRN for agitation and anxiety respectively   - Legal status 9.27     #Nicotine dependence   - Continue with 14 mg patch q24H     #Chronic asymptomatic hyponatremia  - Start at home dose of 1 g sodium chloride, patient Na consistently 130, patient asymptomatic

## 2023-10-19 PROCEDURE — 99232 SBSQ HOSP IP/OBS MODERATE 35: CPT

## 2023-10-19 RX ADMIN — Medication 1 MILLIGRAM(S): at 21:31

## 2023-10-19 RX ADMIN — QUETIAPINE FUMARATE 200 MILLIGRAM(S): 200 TABLET, FILM COATED ORAL at 21:32

## 2023-10-19 RX ADMIN — Medication 2 MILLIGRAM(S): at 13:05

## 2023-10-19 RX ADMIN — Medication 2 MILLIGRAM(S): at 10:40

## 2023-10-19 RX ADMIN — Medication 1 MILLIGRAM(S): at 10:38

## 2023-10-19 RX ADMIN — DIVALPROEX SODIUM 500 MILLIGRAM(S): 500 TABLET, DELAYED RELEASE ORAL at 21:31

## 2023-10-19 RX ADMIN — Medication 2 MILLIGRAM(S): at 21:34

## 2023-10-19 RX ADMIN — DIVALPROEX SODIUM 500 MILLIGRAM(S): 500 TABLET, DELAYED RELEASE ORAL at 10:38

## 2023-10-19 RX ADMIN — SODIUM CHLORIDE 1 GRAM(S): 9 INJECTION INTRAMUSCULAR; INTRAVENOUS; SUBCUTANEOUS at 10:38

## 2023-10-19 NOTE — BH INPATIENT PSYCHIATRY PROGRESS NOTE - NSBHCHARTREVIEWVS_PSY_A_CORE FT
Vital Signs Last 24 Hrs  T(C): 36.6 (10-19-23 @ 09:00), Max: 37.5 (10-18-23 @ 16:40)  T(F): 97.8 (10-19-23 @ 09:00), Max: 99.5 (10-18-23 @ 16:40)  HR: 75 (10-19-23 @ 09:00) (70 - 75)  BP: 135/84 (10-19-23 @ 09:00) (135/84 - 137/91)  BP(mean): --  RR: 18 (10-19-23 @ 09:00) (18 - 18)  SpO2: 98% (10-19-23 @ 09:00) (98% - 98%)

## 2023-10-19 NOTE — BH INPATIENT PSYCHIATRY PROGRESS NOTE - NSBHMETABOLIC_PSY_ALL_CORE_FT
BMI: BMI (kg/m2): 29 (10-14-23 @ 04:31)  HbA1c: A1C with Estimated Average Glucose Result: 5.2 % (10-17-23 @ 11:13)    Glucose:   BP: 135/84 (10-19-23 @ 09:00) (128/89 - 157/98)  Lipid Panel: Date/Time: 10-17-23 @ 11:13  Cholesterol, Serum: 165  Direct LDL: --  HDL Cholesterol, Serum: 58  Total Cholesterol/HDL Ration Measurement: --  Triglycerides, Serum: 93

## 2023-10-19 NOTE — BH INPATIENT PSYCHIATRY PROGRESS NOTE - NSBHFUPINTERVALHXFT_PSY_A_CORE
Not endorsing  suicidal or homicidal ideation intent or plans; no mention of auditory or visual hallucinations  but Isolative; stays in bed; makes minimal eye contact; not conversational. i&J: poor: limited if any awareness of medications; guarded or minimally acknowledging sxs; not reflective on issues that impact on symptoms ; anxious irritable; No behavioral issues. adls poor.

## 2023-10-19 NOTE — BH INPATIENT PSYCHIATRY PROGRESS NOTE - NSBHASSESSSUMMFT_PSY_ALL_CORE
54 yo male, domiciled at residence, [facility] by [facility], with PPH of schizoaffective disorder on prolixin 62.5 mg given on 10/09 per the ED collateral at [facility]. He was brought to the ED after being found going through maillboxes and demonstrating disorganized behavior out in the community. On assessment today, notable symptoms included intermittent auditory hallucinations and, yesterday, the patient reported paranoia and delusional content including the belief that his food was poisoned, as well as mild agitation. He persistently denies SI/HI, intent and plan. He declined to provide any details on further assessment. He reports no other acute complaints at this time. He has not been a behavioral issue on the unit with no aggressive behaviors.     Plan:  # Schizoaffective disorder, bipolar type   - Continue with Depakote  mg orally BID  - Continue with Seroquel 200 mg orally nightly  - Continue with Cogentin 1 mg orally BID for EPS/acute dystonia   - Zprexa/Ativan orally PRN for agitation and anxiety respectively   - Legal status 9.27     #Nicotine dependence   - Continue with 14 mg patch q24H     #Chronic asymptomatic hyponatremia  - Start at home dose of 1 g sodium chloride, patient Na consistently 130, patient asymptomatic    ;;10/19: Not endorsing  suicidal or homicidal ideation intent or plans; no mention of auditory or visual hallucinations  but Isolative; stays in bed; makes minimal eye contact; not conversational. i&J: poor: limited if any awareness of medications; guarded or minimally acknowledging sxs; not reflective on issues that impact on symptoms ; anxious irritable; No behavioral issues. adls poor.  Current medications benztropine 1 milliGRAM(s) Oral two times a day  divalproex  milliGRAM(s) Oral two times a day for mood need to check VPA level  haloperidol     Tablet 5 milliGRAM(s) Oral every 6 hours PRN  LORazepam     Tablet 1 milliGRAM(s) Oral every 6 hours PRN  nicotine  Polacrilex Lozenge 2 milliGRAM(s) Oral every 2 hours PRN for nrt  nicotine -  14 mG/24Hr(s) Patch 1 Patch Transdermal daily for nrt (should avoid gum and patch)  QUEtiapine 200 milliGRAM(s) Oral at bedtime for psychosis and mood   sodium chloride 1 Gram(s) Oral daily for low Na  Sodium: 130 mmol/L (10.18.23 @ 14:50)  to monitor.

## 2023-10-19 NOTE — BH INPATIENT PSYCHIATRY PROGRESS NOTE - CURRENT MEDICATION
MEDICATIONS  (STANDING):  benztropine 1 milliGRAM(s) Oral two times a day  divalproex  milliGRAM(s) Oral two times a day  nicotine -  14 mG/24Hr(s) Patch 1 Patch Transdermal daily  QUEtiapine 200 milliGRAM(s) Oral at bedtime  sodium chloride 1 Gram(s) Oral daily    MEDICATIONS  (PRN):  haloperidol     Tablet 5 milliGRAM(s) Oral every 6 hours PRN agitation  LORazepam     Tablet 1 milliGRAM(s) Oral every 6 hours PRN Agitation  nicotine  Polacrilex Lozenge 2 milliGRAM(s) Oral every 2 hours PRN NRT

## 2023-10-19 NOTE — BH INPATIENT PSYCHIATRY PROGRESS NOTE - PRN MEDS
MEDICATIONS  (PRN):  haloperidol     Tablet 5 milliGRAM(s) Oral every 6 hours PRN agitation  LORazepam     Tablet 1 milliGRAM(s) Oral every 6 hours PRN Agitation  nicotine  Polacrilex Lozenge 2 milliGRAM(s) Oral every 2 hours PRN NRT

## 2023-10-20 PROCEDURE — 99232 SBSQ HOSP IP/OBS MODERATE 35: CPT

## 2023-10-20 RX ADMIN — Medication 1 MILLIGRAM(S): at 22:27

## 2023-10-20 RX ADMIN — Medication 2 MILLIGRAM(S): at 13:06

## 2023-10-20 RX ADMIN — SODIUM CHLORIDE 1 GRAM(S): 9 INJECTION INTRAMUSCULAR; INTRAVENOUS; SUBCUTANEOUS at 10:06

## 2023-10-20 RX ADMIN — Medication 2 MILLIGRAM(S): at 15:13

## 2023-10-20 RX ADMIN — Medication 1 MILLIGRAM(S): at 10:06

## 2023-10-20 RX ADMIN — Medication 2 MILLIGRAM(S): at 10:07

## 2023-10-20 RX ADMIN — QUETIAPINE FUMARATE 200 MILLIGRAM(S): 200 TABLET, FILM COATED ORAL at 22:28

## 2023-10-20 RX ADMIN — Medication 1 MILLIGRAM(S): at 13:06

## 2023-10-20 RX ADMIN — DIVALPROEX SODIUM 500 MILLIGRAM(S): 500 TABLET, DELAYED RELEASE ORAL at 22:28

## 2023-10-20 RX ADMIN — Medication 2 MILLIGRAM(S): at 16:45

## 2023-10-20 RX ADMIN — HALOPERIDOL DECANOATE 5 MILLIGRAM(S): 100 INJECTION INTRAMUSCULAR at 13:05

## 2023-10-20 RX ADMIN — DIVALPROEX SODIUM 500 MILLIGRAM(S): 500 TABLET, DELAYED RELEASE ORAL at 10:06

## 2023-10-20 NOTE — BH INPATIENT PSYCHIATRY PROGRESS NOTE - NSBHASSESSSUMMFT_PSY_ALL_CORE
52 yo male, domiciled at residence, [facility] by [facility], with PPH of schizoaffective disorder on prolixin 62.5 mg given on 10/09 per the ED collateral at [facility]. He was brought to the ED after being found going through maillboxes and demonstrating disorganized behavior out in the community. On assessment today, notable symptoms included intermittent auditory hallucinations and, yesterday, the patient reported paranoia and delusional content including the belief that his food was poisoned, as well as mild agitation. He persistently denies SI/HI, intent and plan. He declined to provide any details on further assessment. He reports no other acute complaints at this time. He has not been a behavioral issue on the unit with no aggressive behaviors.     Plan:  # Schizoaffective disorder, bipolar type   - Continue with Depakote  mg orally BID  - Continue with Seroquel 200 mg orally nightly  - Continue with Cogentin 1 mg orally BID for EPS/acute dystonia   - Depakote level for tomorrow, 10/21/23 in am  - Zyprexa/Ativan orally PRN for agitation and anxiety respectively   - Legal status 9.27     #Nicotine dependence   - Continue with 14 mg patch q24H     #Chronic asymptomatic hyponatremia  - Start at home dose of 1 g sodium chloride, patient Na consistently 130, patient asymptomatic

## 2023-10-20 NOTE — BH INPATIENT PSYCHIATRY PROGRESS NOTE - NSBHATTESTBILLING_PSY_A_CORE
34540-Bkhbiagoaw OBS or IP - low complexity OR 25-34 mins 10214-Nfeiawydev OBS or IP - moderate complexity OR 35-49 mins

## 2023-10-20 NOTE — BH INPATIENT PSYCHIATRY PROGRESS NOTE - NSBHFUPINTERVALHXFT_PSY_A_CORE
Chart and nursing notes reviewed. No acute events overnight. No new complaints. On evaluation today patient was calm, cooperative and pleasant. He standed he felt "great." He remains adherent to standing medications, no adverse effects reports, and agreeable to am depakote level tomorrow. Reported he "always" hears voices, but endorsed they are Adventism voices, because he is a "messianic, Yarsanism, Orthodoxy." Disorganized at times in his thought process/content, chronic auditory hallucinations and delusions present. Patient also stated to writer that prior to admissions he "missed a couple of days of meds" but denied that he did any substances. Patient smiles inappropriately at times, noted to be internally preoccupied, visible on the unit. Denies any visual hallucinations, denied any suicidal or homicidal ideation, intent, or plan. Later in day, after writer spoke with patient, he became involved in a verbal altercation with a fellow peer over a seat in the TV area, at one point patient yelled "do you want to go?" Patient was able to be verbally redirected, no po/IM medications needed, and no further issues. He was noted to be calm later, attending groups.     Depakote level for tomorrow, 10/21/23 in am

## 2023-10-20 NOTE — BH INPATIENT PSYCHIATRY PROGRESS NOTE - NSBHMETABOLIC_PSY_ALL_CORE_FT
BMI: BMI (kg/m2): 29 (10-14-23 @ 04:31)  HbA1c: A1C with Estimated Average Glucose Result: 5.2 % (10-17-23 @ 11:13)  Glucose: 88  BP: 117/76 (10-20-23 @ 09:42) (117/76 - 157/98)  Lipid Panel: Date/Time: 10-17-23 @ 11:13  Cholesterol, Serum: 165  Direct LDL: 88  HDL Cholesterol, Serum: 58  Total Cholesterol/HDL Ration Measurement: --  Triglycerides, Serum: 93   BMI: BMI (kg/m2): 29 (10-14-23 @ 04:31)  HbA1c: A1C with Estimated Average Glucose Result: 5.2 % (10-17-23 @ 11:13)    Glucose:   BP: 117/79 (11-01-23 @ 08:46) (117/79 - 152/86)  Lipid Panel: Date/Time: 10-17-23 @ 11:13  Cholesterol, Serum: 165  Direct LDL: --  HDL Cholesterol, Serum: 58  Total Cholesterol/HDL Ration Measurement: --  Triglycerides, Serum: 93

## 2023-10-20 NOTE — BH INPATIENT PSYCHIATRY PROGRESS NOTE - NSBHCHARTREVIEWVS_PSY_A_CORE FT
Vital Signs Last 24 Hrs  T(C): 36.4 (10-20-23 @ 09:42), Max: 37.2 (10-19-23 @ 16:04)  T(F): 97.6 (10-20-23 @ 09:42), Max: 98.9 (10-19-23 @ 16:04)  HR: 73 (10-20-23 @ 09:42) (66 - 73)  BP: 117/76 (10-20-23 @ 09:42) (117/76 - 134/87)  RR: 18 (10-19-23 @ 16:04) (18 - 18)  SpO2: 98% (10-20-23 @ 09:42) (98% - 98%)     Vital Signs Last 24 Hrs  T(C): --  T(F): --  HR: --  BP: --  BP(mean): --  RR: --  SpO2: --

## 2023-10-21 RX ADMIN — DIVALPROEX SODIUM 500 MILLIGRAM(S): 500 TABLET, DELAYED RELEASE ORAL at 22:02

## 2023-10-21 RX ADMIN — Medication 1 MILLIGRAM(S): at 22:03

## 2023-10-21 RX ADMIN — HALOPERIDOL DECANOATE 5 MILLIGRAM(S): 100 INJECTION INTRAMUSCULAR at 18:32

## 2023-10-21 RX ADMIN — SODIUM CHLORIDE 1 GRAM(S): 9 INJECTION INTRAMUSCULAR; INTRAVENOUS; SUBCUTANEOUS at 11:04

## 2023-10-21 RX ADMIN — Medication 1 MILLIGRAM(S): at 11:04

## 2023-10-21 RX ADMIN — Medication 1 MILLIGRAM(S): at 18:30

## 2023-10-21 RX ADMIN — Medication 2 MILLIGRAM(S): at 16:31

## 2023-10-21 RX ADMIN — QUETIAPINE FUMARATE 200 MILLIGRAM(S): 200 TABLET, FILM COATED ORAL at 22:02

## 2023-10-21 RX ADMIN — Medication 2 MILLIGRAM(S): at 22:04

## 2023-10-21 RX ADMIN — DIVALPROEX SODIUM 500 MILLIGRAM(S): 500 TABLET, DELAYED RELEASE ORAL at 11:05

## 2023-10-22 RX ADMIN — Medication 2 MILLIGRAM(S): at 12:14

## 2023-10-22 RX ADMIN — Medication 1 MILLIGRAM(S): at 21:27

## 2023-10-22 RX ADMIN — DIVALPROEX SODIUM 500 MILLIGRAM(S): 500 TABLET, DELAYED RELEASE ORAL at 21:27

## 2023-10-22 RX ADMIN — Medication 2 MILLIGRAM(S): at 16:26

## 2023-10-22 RX ADMIN — Medication 2 MILLIGRAM(S): at 09:44

## 2023-10-22 RX ADMIN — QUETIAPINE FUMARATE 200 MILLIGRAM(S): 200 TABLET, FILM COATED ORAL at 21:27

## 2023-10-22 RX ADMIN — DIVALPROEX SODIUM 500 MILLIGRAM(S): 500 TABLET, DELAYED RELEASE ORAL at 09:43

## 2023-10-22 RX ADMIN — Medication 1 MILLIGRAM(S): at 09:43

## 2023-10-22 RX ADMIN — SODIUM CHLORIDE 1 GRAM(S): 9 INJECTION INTRAMUSCULAR; INTRAVENOUS; SUBCUTANEOUS at 09:43

## 2023-10-23 PROCEDURE — 73610 X-RAY EXAM OF ANKLE: CPT | Mod: 26,LT

## 2023-10-23 PROCEDURE — 99232 SBSQ HOSP IP/OBS MODERATE 35: CPT

## 2023-10-23 RX ORDER — ACETAMINOPHEN 500 MG
650 TABLET ORAL EVERY 6 HOURS
Refills: 0 | Status: DISCONTINUED | OUTPATIENT
Start: 2023-10-23 | End: 2023-11-01

## 2023-10-23 RX ORDER — IBUPROFEN 200 MG
600 TABLET ORAL EVERY 6 HOURS
Refills: 0 | Status: DISCONTINUED | OUTPATIENT
Start: 2023-10-23 | End: 2023-11-01

## 2023-10-23 RX ADMIN — DIVALPROEX SODIUM 500 MILLIGRAM(S): 500 TABLET, DELAYED RELEASE ORAL at 10:59

## 2023-10-23 RX ADMIN — Medication 1 MILLIGRAM(S): at 21:30

## 2023-10-23 RX ADMIN — QUETIAPINE FUMARATE 200 MILLIGRAM(S): 200 TABLET, FILM COATED ORAL at 21:30

## 2023-10-23 RX ADMIN — Medication 600 MILLIGRAM(S): at 14:30

## 2023-10-23 RX ADMIN — DIVALPROEX SODIUM 500 MILLIGRAM(S): 500 TABLET, DELAYED RELEASE ORAL at 21:30

## 2023-10-23 RX ADMIN — Medication 2 MILLIGRAM(S): at 12:52

## 2023-10-23 RX ADMIN — Medication 600 MILLIGRAM(S): at 13:55

## 2023-10-23 RX ADMIN — SODIUM CHLORIDE 1 GRAM(S): 9 INJECTION INTRAMUSCULAR; INTRAVENOUS; SUBCUTANEOUS at 10:59

## 2023-10-23 RX ADMIN — Medication 2 MILLIGRAM(S): at 21:33

## 2023-10-23 RX ADMIN — Medication 600 MILLIGRAM(S): at 21:31

## 2023-10-23 RX ADMIN — Medication 1 MILLIGRAM(S): at 10:59

## 2023-10-23 RX ADMIN — Medication 600 MILLIGRAM(S): at 22:30

## 2023-10-23 NOTE — BH INPATIENT PSYCHIATRY PROGRESS NOTE - CURRENT MEDICATION
MEDICATIONS  (STANDING):  benztropine 1 milliGRAM(s) Oral two times a day  divalproex  milliGRAM(s) Oral two times a day  nicotine -  14 mG/24Hr(s) Patch 1 Patch Transdermal daily  QUEtiapine 200 milliGRAM(s) Oral at bedtime  sodium chloride 1 Gram(s) Oral daily    MEDICATIONS  (PRN):  acetaminophen     Tablet .. 650 milliGRAM(s) Oral every 6 hours PRN Moderate Pain (4 - 6)  haloperidol     Tablet 5 milliGRAM(s) Oral every 6 hours PRN agitation  ibuprofen  Tablet. 600 milliGRAM(s) Oral every 6 hours PRN Severe Pain (7 - 10)  nicotine  Polacrilex Lozenge 2 milliGRAM(s) Oral every 2 hours PRN NRT   MEDICATIONS  (STANDING):    MEDICATIONS  (PRN):

## 2023-10-23 NOTE — BH INPATIENT PSYCHIATRY PROGRESS NOTE - NSBHFUPINTERVALHXFT_PSY_A_CORE
Chart and nursing notes reviewed. No acute events overnight. No new complaints. On evaluation today patient was calm, cooperative and pleasant. He standed he felt "great." He remains adherent to standing medications, no adverse effects reports, and agreeable to am depakote level tomorrow. Reported he "always" hears voices, but endorsed they are Episcopalian voices, because he is a "messianic, Rastafari, Confucianism." Disorganized at times in his thought process/content, chronic auditory hallucinations and delusions present. Patient also stated to writer that prior to admissions he "missed a couple of days of meds" but denied that he did any substances. Patient smiles inappropriately at times, noted to be internally preoccupied, visible on the unit. Denies any visual hallucinations, denied any suicidal or homicidal ideation, intent, or plan. Later in day, after writer spoke with patient, he became involved in a verbal altercation with a fellow peer over a seat in the TV area, at one point patient yelled "do you want to go?" Patient was able to be verbally redirected, no po/IM medications needed, and no further issues. He was noted to be calm later, attending groups.     Depakote level for tomorrow, 10/21/23 in am Pt has a lot of pain in R ankle. When I examined it, it was very swollen and I ordered X ray. Pt says he fell off a phylicia some time ago but told another staff member a  hit his ankle. Ibuprofen 600 mg ordered in addition to tylenol for pain. Will call ortho and podiatry. Continue depakote and prolixin dec.

## 2023-10-23 NOTE — BH INPATIENT PSYCHIATRY PROGRESS NOTE - NSBHASSESSSUMMFT_PSY_ALL_CORE
52 yo male, domiciled at residence, [facility] by [facility], with PPH of schizoaffective disorder on prolixin 62.5 mg given on 10/09 per the ED collateral at [facility]. He was brought to the ED after being found going through maillboxes and demonstrating disorganized behavior out in the community. On assessment today, notable symptoms included intermittent auditory hallucinations and, yesterday, the patient reported paranoia and delusional content including the belief that his food was poisoned, as well as mild agitation. He persistently denies SI/HI, intent and plan. He declined to provide any details on further assessment. He reports no other acute complaints at this time. He has not been a behavioral issue on the unit with no aggressive behaviors.     Plan:  # Schizoaffective disorder, bipolar type   - Continue with Depakote  mg orally BID  - Continue with Seroquel 200 mg orally nightly  - Continue with Cogentin 1 mg orally BID for EPS/acute dystonia   - Depakote level for tomorrow, 10/21/23 in am  - Zyprexa/Ativan orally PRN for agitation and anxiety respectively   - Legal status 9.27     #Nicotine dependence   - Continue with 14 mg patch q24H     #Chronic asymptomatic hyponatremia  - Start at home dose of 1 g sodium chloride, patient Na consistently 130, patient asymptomatic

## 2023-10-23 NOTE — BH INPATIENT PSYCHIATRY PROGRESS NOTE - NSBHMETABOLIC_PSY_ALL_CORE_FT
BMI: BMI (kg/m2): 29 (10-14-23 @ 04:31)  HbA1c: A1C with Estimated Average Glucose Result: 5.2 % (10-17-23 @ 11:13)    Glucose:   BP: 152/90 (10-23-23 @ 08:16) (113/76 - 152/90)  Lipid Panel: Date/Time: 10-17-23 @ 11:13  Cholesterol, Serum: 165  Direct LDL: --  HDL Cholesterol, Serum: 58  Total Cholesterol/HDL Ration Measurement: --  Triglycerides, Serum: 93   BMI: BMI (kg/m2): 29 (10-14-23 @ 04:31)  HbA1c: A1C with Estimated Average Glucose Result: 5.2 % (10-17-23 @ 11:13)    Glucose:   BP: 117/79 (11-01-23 @ 08:46) (117/79 - 152/86)  Lipid Panel: Date/Time: 10-17-23 @ 11:13  Cholesterol, Serum: 165  Direct LDL: --  HDL Cholesterol, Serum: 58  Total Cholesterol/HDL Ration Measurement: --  Triglycerides, Serum: 93

## 2023-10-23 NOTE — BH INPATIENT PSYCHIATRY PROGRESS NOTE - PRN MEDS
MEDICATIONS  (PRN):  acetaminophen     Tablet .. 650 milliGRAM(s) Oral every 6 hours PRN Moderate Pain (4 - 6)  haloperidol     Tablet 5 milliGRAM(s) Oral every 6 hours PRN agitation  ibuprofen  Tablet. 600 milliGRAM(s) Oral every 6 hours PRN Severe Pain (7 - 10)  nicotine  Polacrilex Lozenge 2 milliGRAM(s) Oral every 2 hours PRN NRT   MEDICATIONS  (PRN):

## 2023-10-23 NOTE — BH INPATIENT PSYCHIATRY PROGRESS NOTE - NSBHCHARTREVIEWVS_PSY_A_CORE FT
Vital Signs Last 24 Hrs  T(C): 37 (10-23-23 @ 08:16), Max: 37.6 (10-22-23 @ 16:42)  T(F): 98.6 (10-23-23 @ 08:16), Max: 99.6 (10-22-23 @ 16:42)  HR: 64 (10-23-23 @ 08:16) (64 - 70)  BP: 152/90 (10-23-23 @ 08:16) (139/80 - 152/90)  BP(mean): --  RR: 18 (10-22-23 @ 16:42) (18 - 18)  SpO2: 98% (10-23-23 @ 08:16) (97% - 98%)     Vital Signs Last 24 Hrs  T(C): --  T(F): --  HR: --  BP: --  BP(mean): --  RR: --  SpO2: --

## 2023-10-24 PROCEDURE — 99232 SBSQ HOSP IP/OBS MODERATE 35: CPT

## 2023-10-24 RX ADMIN — Medication 650 MILLIGRAM(S): at 21:18

## 2023-10-24 RX ADMIN — QUETIAPINE FUMARATE 200 MILLIGRAM(S): 200 TABLET, FILM COATED ORAL at 21:16

## 2023-10-24 RX ADMIN — Medication 1 MILLIGRAM(S): at 10:54

## 2023-10-24 RX ADMIN — Medication 650 MILLIGRAM(S): at 21:57

## 2023-10-24 RX ADMIN — SODIUM CHLORIDE 1 GRAM(S): 9 INJECTION INTRAMUSCULAR; INTRAVENOUS; SUBCUTANEOUS at 10:54

## 2023-10-24 RX ADMIN — Medication 1 MILLIGRAM(S): at 21:17

## 2023-10-24 RX ADMIN — DIVALPROEX SODIUM 500 MILLIGRAM(S): 500 TABLET, DELAYED RELEASE ORAL at 21:16

## 2023-10-24 RX ADMIN — DIVALPROEX SODIUM 500 MILLIGRAM(S): 500 TABLET, DELAYED RELEASE ORAL at 10:53

## 2023-10-24 NOTE — BH INPATIENT PSYCHIATRY PROGRESS NOTE - NSBHCHARTREVIEWVS_PSY_A_CORE FT
Vital Signs Last 24 Hrs  T(C): 37 (10-24-23 @ 16:27), Max: 37.2 (10-23-23 @ 16:50)  T(F): 98.6 (10-24-23 @ 16:27), Max: 99 (10-23-23 @ 16:50)  HR: 87 (10-24-23 @ 16:27) (71 - 87)  BP: 143/86 (10-24-23 @ 16:27) (126/75 - 143/86)  RR: 18 (10-24-23 @ 16:27) (18 - 18)  SpO2: 96% (10-24-23 @ 16:27) (96% - 97%)     Vital Signs Last 24 Hrs  T(C): --  T(F): --  HR: --  BP: --  BP(mean): --  RR: --  SpO2: --

## 2023-10-24 NOTE — BH SAFETY PLAN - WARNING SIGN 1
Pt completed a written safety plan and received a copy to take with him. An additional copy has been filed in Pt's chart on the unit.

## 2023-10-24 NOTE — BH INPATIENT PSYCHIATRY PROGRESS NOTE - NSBHFUPINTERVALHXFT_PSY_A_CORE
Chart and nursing notes reviewed. No acute events overnight. No new complaints. Patient calm and cooperative. Adherent to standing medications. Continues to report auditory hallucinations, smiles inappropriately at times, oddly related, but mostly in behavioral control. Endorses chronic persecutory delusions, and continues providing different treatment team members different version of why his ankle is swollen. Xray L ankle resulted, will consult ortho tomorrow. Disorganized at times in his thought process/content, Denies any visual hallucinations, denied any suicidal or homicidal ideation, intent, or plan. Asked writer if Prolixin could be tomorrow so he could get "ready." Writer stated that would be fine, patient has complained in past to writer about wanting to eventually discontinue KIDD.     Will attempt Depakote level tomorrow in am 10/25/23, patient previously refused on 10/21/23   Prolixin 62.5 mg KIDD ordered for tomorrow   Ortho consult tomorrow

## 2023-10-24 NOTE — BH INPATIENT PSYCHIATRY PROGRESS NOTE - NSBHMETABOLIC_PSY_ALL_CORE_FT
BMI: BMI (kg/m2): 29 (10-14-23 @ 04:31)  HbA1c: A1C with Estimated Average Glucose Result: 5.2 % (10-17-23 @ 11:13)  Glucose: 88  BP: 143/86 (10-24-23 @ 16:27) (126/75 - 152/90)  Lipid Panel: Date/Time: 10-17-23 @ 11:13  Cholesterol, Serum: 165  Direct LDL: 88  HDL Cholesterol, Serum: 58  Triglycerides, Serum: 93   BMI: BMI (kg/m2): 29 (10-14-23 @ 04:31)  HbA1c: A1C with Estimated Average Glucose Result: 5.2 % (10-17-23 @ 11:13)    Glucose:   BP: 117/79 (11-01-23 @ 08:46) (117/79 - 152/86)  Lipid Panel: Date/Time: 10-17-23 @ 11:13  Cholesterol, Serum: 165  Direct LDL: --  HDL Cholesterol, Serum: 58  Total Cholesterol/HDL Ration Measurement: --  Triglycerides, Serum: 93

## 2023-10-24 NOTE — BH INPATIENT PSYCHIATRY PROGRESS NOTE - NSBHASSESSSUMMFT_PSY_ALL_CORE
52 yo male, domiciled at residence, [facility], with PPH of schizoaffective disorder on prolixin 62.5 mg given on 10/09 per the ED collateral at [facility]. He was brought to the ED after being found going through maillboxes and demonstrating disorganized behavior out in the community. On assessment today, notable symptoms included intermittent auditory hallucinations and, yesterday, the patient reported paranoia and delusional content including the belief that his food was poisoned, as well as mild agitation. He persistently denies SI/HI, intent and plan. He declined to provide any details on further assessment. He reports no other acute complaints at this time. He has not been a behavioral issue on the unit with no aggressive behaviors.     Plan:  # Schizoaffective disorder, bipolar type   - Continue with Depakote  mg orally BID  - Continue with Seroquel 200 mg orally nightly  - Continue with Cogentin 1 mg orally BID for EPS/acute dystonia   - Depakote level for tomorrow, 10/25/23 in am  - Prolixin 62.5 mg KIDD ordered for tomorrow, patient receives q2w  - Zyprexa/Ativan orally PRN for agitation and anxiety respectively   - Legal status 9.27     #Nicotine dependence   - Continue with 14 mg patch q24H     #Chronic asymptomatic hyponatremia  - Start at home dose of 1 g sodium chloride, patient Na consistently 130, patient asymptomatic    #Left ankle swelling  - Xrays ordered, resulted   - Ortho consult to be placed tomorrow

## 2023-10-25 LAB
VALPROATE SERPL-MCNC: 49.5 UG/ML — LOW (ref 50–100)
VALPROATE SERPL-MCNC: 49.5 UG/ML — LOW (ref 50–100)

## 2023-10-25 PROCEDURE — 99232 SBSQ HOSP IP/OBS MODERATE 35: CPT

## 2023-10-25 PROCEDURE — 99222 1ST HOSP IP/OBS MODERATE 55: CPT

## 2023-10-25 RX ORDER — FLUPHENAZINE HYDROCHLORIDE 1 MG/1
62.5 TABLET, FILM COATED ORAL ONCE
Refills: 0 | Status: COMPLETED | OUTPATIENT
Start: 2023-10-25 | End: 2023-10-25

## 2023-10-25 RX ORDER — FLUPHENAZINE HYDROCHLORIDE 1 MG/1
62.5 TABLET, FILM COATED ORAL ONCE
Refills: 0 | Status: DISCONTINUED | OUTPATIENT
Start: 2023-10-25 | End: 2023-10-25

## 2023-10-25 RX ORDER — VALPROIC ACID (AS SODIUM SALT) 250 MG/5ML
750 SOLUTION, ORAL ORAL
Refills: 0 | Status: DISCONTINUED | OUTPATIENT
Start: 2023-10-25 | End: 2023-11-01

## 2023-10-25 RX ADMIN — Medication 2 MILLIGRAM(S): at 12:01

## 2023-10-25 RX ADMIN — QUETIAPINE FUMARATE 200 MILLIGRAM(S): 200 TABLET, FILM COATED ORAL at 21:51

## 2023-10-25 RX ADMIN — Medication 1 MILLIGRAM(S): at 10:32

## 2023-10-25 RX ADMIN — Medication 600 MILLIGRAM(S): at 12:30

## 2023-10-25 RX ADMIN — Medication 600 MILLIGRAM(S): at 22:45

## 2023-10-25 RX ADMIN — Medication 1 MILLIGRAM(S): at 21:53

## 2023-10-25 RX ADMIN — Medication 600 MILLIGRAM(S): at 21:52

## 2023-10-25 RX ADMIN — Medication 600 MILLIGRAM(S): at 12:01

## 2023-10-25 RX ADMIN — SODIUM CHLORIDE 1 GRAM(S): 9 INJECTION INTRAMUSCULAR; INTRAVENOUS; SUBCUTANEOUS at 10:32

## 2023-10-25 RX ADMIN — FLUPHENAZINE HYDROCHLORIDE 62.5 MILLIGRAM(S): 1 TABLET, FILM COATED ORAL at 16:29

## 2023-10-25 RX ADMIN — Medication 750 MILLIGRAM(S): at 21:53

## 2023-10-25 RX ADMIN — Medication 2 MILLIGRAM(S): at 10:31

## 2023-10-25 RX ADMIN — DIVALPROEX SODIUM 500 MILLIGRAM(S): 500 TABLET, DELAYED RELEASE ORAL at 10:32

## 2023-10-25 NOTE — BH INPATIENT PSYCHIATRY PROGRESS NOTE - NSBHMETABOLIC_PSY_ALL_CORE_FT
BMI: BMI (kg/m2): 29 (10-14-23 @ 04:31)  HbA1c: A1C with Estimated Average Glucose Result: 5.2 % (10-17-23 @ 11:13)  Glucose: 88  BP: 127/79 (10-25-23 @ 08:16) (126/75 - 152/90)  Lipid Panel: Date/Time: 10-17-23 @ 11:13  Cholesterol, Serum: 165  Direct LDL: 88  HDL Cholesterol, Serum: 58  Triglycerides, Serum: 93   BMI: BMI (kg/m2): 29 (10-14-23 @ 04:31)  HbA1c: A1C with Estimated Average Glucose Result: 5.2 % (10-17-23 @ 11:13)    Glucose:   BP: 117/79 (11-01-23 @ 08:46) (117/79 - 152/86)  Lipid Panel: Date/Time: 10-17-23 @ 11:13  Cholesterol, Serum: 165  Direct LDL: --  HDL Cholesterol, Serum: 58  Total Cholesterol/HDL Ration Measurement: --  Triglycerides, Serum: 93

## 2023-10-25 NOTE — BH TREATMENT PLAN - NSTXPSYCHOGOALOTHER_PSY_ALL_CORE
less preoccupied with chronic delusions, in good behavioral control, decreased auditory hallucinations, able to care for self, no danger of SI/HI

## 2023-10-25 NOTE — BH INPATIENT PSYCHIATRY PROGRESS NOTE - NSBHCHARTREVIEWVS_PSY_A_CORE FT
Vital Signs Last 24 Hrs  T(C): 37.1 (10-25-23 @ 08:16), Max: 37.1 (10-25-23 @ 08:16)  T(F): 98.8 (10-25-23 @ 08:16), Max: 98.8 (10-25-23 @ 08:16)  HR: 74 (10-25-23 @ 08:16) (74 - 87)  BP: 127/79 (10-25-23 @ 08:16) (127/79 - 143/86)  RR: 18 (10-25-23 @ 08:16) (18 - 18)  SpO2: 92% (10-25-23 @ 08:16) (92% - 96%)     Vital Signs Last 24 Hrs  T(C): --  T(F): --  HR: --  BP: --  BP(mean): --  RR: --  SpO2: --

## 2023-10-25 NOTE — BH PATIENT CHARACTERISTICS SURVEY - NSPCSEMPLOYMENT_PSY_ALL_CORE
Addended by: VANDANA WRIGHT on: 2/5/2020 12:03 PM     Modules accepted: Orders    
Not In Labor Force: unemployed but not looking for work, retired, homemaker, student, incarcerated, or psychiatric inpatient, underage   of employment/below working age

## 2023-10-25 NOTE — BH PATIENT CHARACTERISTICS SURVEY - NSPCSLIVINGSITUA_PSY_ALL_CORE
Other (e.g., non-[facility] residential care such as group home or skilled nursing house) Individualized Residential Alternative ([facility])

## 2023-10-25 NOTE — BH INPATIENT PSYCHIATRY PROGRESS NOTE - CURRENT MEDICATION
MEDICATIONS  (STANDING):  benztropine 1 milliGRAM(s) Oral two times a day  fluPHENAZine decanoate Injectable, Long Acting 62.5 milliGRAM(s) IntraMuscular once  nicotine -  14 mG/24Hr(s) Patch 1 Patch Transdermal daily  QUEtiapine 200 milliGRAM(s) Oral at bedtime  sodium chloride 1 Gram(s) Oral daily  valproic acid 750 milliGRAM(s) Oral two times a day    MEDICATIONS  (PRN):  acetaminophen     Tablet .. 650 milliGRAM(s) Oral every 6 hours PRN Moderate Pain (4 - 6)  haloperidol     Tablet 5 milliGRAM(s) Oral every 6 hours PRN agitation  ibuprofen  Tablet. 600 milliGRAM(s) Oral every 6 hours PRN Severe Pain (7 - 10)  nicotine  Polacrilex Lozenge 2 milliGRAM(s) Oral every 2 hours PRN NRT   MEDICATIONS  (STANDING):    MEDICATIONS  (PRN):

## 2023-10-25 NOTE — BH INPATIENT PSYCHIATRY PROGRESS NOTE - NSBHASSESSSUMMFT_PSY_ALL_CORE
54 yo male, domiciled at residence, followed by [facility], with PPH of schizoaffective disorder on prolixin 62.5 mg given on 10/09 per the ED collateral at [facility]. He was brought to the ED after being found going through maillboxes and demonstrating disorganized behavior out in the community. On assessment today, notable symptoms included intermittent auditory hallucinations and, yesterday, the patient reported paranoia and delusional content including the belief that his food was poisoned, as well as mild agitation. He persistently denies SI/HI, intent and plan. He declined to provide any details on further assessment. He reports no other acute complaints at this time. He has not been a behavioral issue on the unit with no aggressive behaviors.     Plan:  # Schizoaffective disorder, bipolar type   - Depakote switched to Depakene due to possible medication diversion (depakote level subtherapeutic 49.5 on 10/25/23), and increased Depakene to 750 mg orally twice daily from 500 mg orally twice daily for mood stabilization   - Continue with Seroquel 200 mg orally nightly  - Continue with Cogentin 1 mg orally BID for EPS/acute dystonia   - Depakote level 10/25/23 in am was 49.5 (sub-therapeutic)   - Prolixin 62.5 mg KIDD ordered for today 10/25/23   - Zyprexa/Ativan orally PRN for agitation and anxiety respectively   - Legal status 9.27     #Nicotine dependence   - Continue with 14 mg patch q24H     #Chronic asymptomatic hyponatremia  - Start at home dose of 1 g sodium chloride, patient Na consistently 130, patient asymptomatic    #Left ankle swelling  - Xrays ordered, resulted   - Ortho consult to be placed tomorrow

## 2023-10-25 NOTE — BH INPATIENT PSYCHIATRY PROGRESS NOTE - NSBHFUPINTERVALHXFT_PSY_A_CORE
Chart and nursing notes reviewed. No acute events overnight. No new complaints. Patient cooperative. Adherent to standing medications. Slightly more irritable today than in previous encounters. Became upset with writer after he told him he could not take medication with him to room to take later (had attempted to take his nicotine lozenge with him to his room). Also became upset and stated "why cant I have my own rights?" When writer asked patient if he was in agreement with increase in Depakote and switch to Depakene due to concerns of possibly diverting medications. He initially stated "I don't need the Depakote" but ultimately agreed and was calm and cooperative. Noted several times throughout day, talking to self, laughing to self, smiling inappropriately but otherwise in good behavioral control. Ortho saw patient, recs appreciated, nothing acute for his leg. Reports auditory hallucinations, oddly related, and chronic delusions, which appear close to baseline. Disorganized at times in his thought process/content, denies visual hallucinations, denies any suicidal or homicidal ideation, intent, or plan.     Writer spoke to RN from patient's ACT team, Judy, regarding update. She stated that from the update he appeared closer to baseline, and endorsed his alcohol and recent meth use as a precipitating factor for psychiatric decompensation. Requested they be notified prior to discharge, which was projected for early next week, they are in agreement with plan.     Patient to receive Prolixin KIDD 62.5 mg today.   Depakote switched to Depakene and dose increased to 750 mg orally twice daily from 500 mg orally twice daily   Podiatry consulted, recs to be appreciated

## 2023-10-25 NOTE — CONSULT NOTE ADULT - SUBJECTIVE AND OBJECTIVE BOX
Orthopaedic Consult Note    HPI  53yMale with hx of paranoid schizophrenia, methamphetamine use, admitted to [facility] for auditory hallucinations c/o chronic left ankle pain since his late 20s. Pt reports left ankle pain that comes and goes, with persistent soft tissue swelling. He is able to ambulate without assistance with some mild discomfort. He reports having been seen in the past for his left ankle pain and was told that "he will die before his left ankle pain ever gets better". Pt denies numbness/tingling of left lower extremity. Denies pain to any other joints or extremities.     PAST MEDICAL & SURGICAL HISTORY:    Allergies    Artane (Other (Mild))  Haldol (Other (Mild))  Inderal (Other (Mild))    Intolerances      MEDICATIONS  (STANDING):  benztropine 1 milliGRAM(s) Oral two times a day  fluPHENAZine decanoate Injectable, Long Acting 62.5 milliGRAM(s) IntraMuscular once  nicotine -  14 mG/24Hr(s) Patch 1 Patch Transdermal daily  QUEtiapine 200 milliGRAM(s) Oral at bedtime  sodium chloride 1 Gram(s) Oral daily  valproic acid 750 milliGRAM(s) Oral two times a day      Vital Signs Last 24 Hrs  T(C): 37.1 (25 Oct 2023 08:16), Max: 37.1 (25 Oct 2023 08:16)  T(F): 98.8 (25 Oct 2023 08:16), Max: 98.8 (25 Oct 2023 08:16)  HR: 74 (25 Oct 2023 08:16) (74 - 87)  BP: 127/79 (25 Oct 2023 08:16) (127/79 - 143/86)  BP(mean): --  RR: 18 (25 Oct 2023 08:16) (18 - 18)  SpO2: 92% (25 Oct 2023 08:16) (92% - 96%)    Parameters below as of 25 Oct 2023 08:16  Patient On (Oxygen Delivery Method): room air        Physical Exam: Pt ambulating up and down the hallway of unit.  Pt sitting comfortably in chair at time of exam, NAD.   Skin warm and well perfused; no visible erythema/ecchymoses  Soft tissue swelling at lateral and medial mal of left ankle   Left ankle non tender to palpation at bilateral malleoli   Mild TTP at anterior aspect of left ankle at joint line  Pt actively ranges left ankle without pain, no pain with passive inversion/eversion of left ankle   EHL/FHL/TA/GS 5/5 left lower extremity   Pt reports tingling with light touch of dorsal aspect of ankle, SLT otherwise in tact   Brisk capillary refill distal left lower extremity         Imaging:     ACC: [id number] EXAM:  XR ANKLE COMP MIN 3 VIEWS LT   ORDERED BY: NELDA MIN     PROCEDURE DATE:  10/23/2023          INTERPRETATION:  CLINICAL HISTORY: 53-year-old with injury.          IMPRESSION: Frontal, lateral and oblique views of the leftankle   demonstrate severe narrowing of the tibiotalar joint. There are multiple   fragments adjacent to the medial malleolus and prior soft tissue injury.   There is diffuse soft tissue swelling. There is a lucency and overlying   chondral defect within the medial talar dome measuring 11 mm which could   represent a osteochondral defect.    Large osteophyte at the anterior aspect of the tibiotalar joint measuring   2.0 cm with osteophytosis of the anterior tibia and talar head neck   junction likely contributing to anterior ankle impingement with adjacent   soft tissue swelling. Osteophytosis of the posterior tibia possibly also   contributing to posterior ankle impingement. Plantar calcaneal spurring.   Negative for fracture.    --- End of Report ---        LELO ROMERO MD; Attending Radiologist  This document has been electronically signed. Oct 23 2023 12:37PM    A/P: 53yMale w/ left ankle arthritis, impingement   No acute orthopaedic intervention  WBAT  Ice/elevation for swelling   Pain control as needed  Can wear an ace wrap for compression/comfort   Discussed with Dr. Wyman        Orthopaedic Consult Note    HPI  53yMale with hx of paranoid schizophrenia, methamphetamine use, admitted to [facility] for auditory hallucinations c/o chronic left ankle pain since his late 20s. Pt reports left ankle pain that comes and goes, with persistent soft tissue swelling. He denies acute trauma/injury. He is able to ambulate without assistance with some mild discomfort. He reports having been seen in the past for his left ankle pain and was told that "he will die before his left ankle pain ever gets better". Pt denies numbness/tingling of left lower extremity. Denies pain to any other joints or extremities.     PAST MEDICAL & SURGICAL HISTORY:    Allergies    Artane (Other (Mild))  Haldol (Other (Mild))  Inderal (Other (Mild))    Intolerances      MEDICATIONS  (STANDING):  benztropine 1 milliGRAM(s) Oral two times a day  fluPHENAZine decanoate Injectable, Long Acting 62.5 milliGRAM(s) IntraMuscular once  nicotine -  14 mG/24Hr(s) Patch 1 Patch Transdermal daily  QUEtiapine 200 milliGRAM(s) Oral at bedtime  sodium chloride 1 Gram(s) Oral daily  valproic acid 750 milliGRAM(s) Oral two times a day      Vital Signs Last 24 Hrs  T(C): 37.1 (25 Oct 2023 08:16), Max: 37.1 (25 Oct 2023 08:16)  T(F): 98.8 (25 Oct 2023 08:16), Max: 98.8 (25 Oct 2023 08:16)  HR: 74 (25 Oct 2023 08:16) (74 - 87)  BP: 127/79 (25 Oct 2023 08:16) (127/79 - 143/86)  BP(mean): --  RR: 18 (25 Oct 2023 08:16) (18 - 18)  SpO2: 92% (25 Oct 2023 08:16) (92% - 96%)    Parameters below as of 25 Oct 2023 08:16  Patient On (Oxygen Delivery Method): room air        Physical Exam: Pt ambulating up and down the hallway of unit.  Pt sitting comfortably in chair at time of exam, NAD.   Skin warm and well perfused; no visible erythema/ecchymoses  Soft tissue swelling at lateral and medial mal of left ankle   Left ankle non tender to palpation at bilateral malleoli   Mild TTP at anterior aspect of left ankle at joint line  Pt actively ranges left ankle without pain, no pain with passive inversion/eversion of left ankle   EHL/FHL/TA/GS 5/5 left lower extremity   Pt reports tingling with light touch of dorsal aspect of ankle, SLT otherwise in tact   Brisk capillary refill distal left lower extremity         Imaging:     ACC: [id number] EXAM:  XR ANKLE COMP MIN 3 VIEWS LT   ORDERED BY: NELDA MIN     PROCEDURE DATE:  10/23/2023          INTERPRETATION:  CLINICAL HISTORY: 53-year-old with injury.          IMPRESSION: Frontal, lateral and oblique views of the leftankle   demonstrate severe narrowing of the tibiotalar joint. There are multiple   fragments adjacent to the medial malleolus and prior soft tissue injury.   There is diffuse soft tissue swelling. There is a lucency and overlying   chondral defect within the medial talar dome measuring 11 mm which could   represent a osteochondral defect.    Large osteophyte at the anterior aspect of the tibiotalar joint measuring   2.0 cm with osteophytosis of the anterior tibia and talar head neck   junction likely contributing to anterior ankle impingement with adjacent   soft tissue swelling. Osteophytosis of the posterior tibia possibly also   contributing to posterior ankle impingement. Plantar calcaneal spurring.   Negative for fracture.    --- End of Report ---        LELO ROMERO MD; Attending Radiologist  This document has been electronically signed. Oct 23 2023 12:37PM    A/P: 53yMale w/ left ankle arthritis, impingement   No acute orthopaedic intervention  WBAT  Ice/elevation for swelling   Pain control as needed  Can wear an ace wrap for compression/comfort   Discussed with Dr. Wyman

## 2023-10-26 PROCEDURE — 99232 SBSQ HOSP IP/OBS MODERATE 35: CPT

## 2023-10-26 RX ADMIN — Medication 2 MILLIGRAM(S): at 12:09

## 2023-10-26 RX ADMIN — Medication 1 MILLIGRAM(S): at 12:08

## 2023-10-26 RX ADMIN — QUETIAPINE FUMARATE 200 MILLIGRAM(S): 200 TABLET, FILM COATED ORAL at 21:56

## 2023-10-26 RX ADMIN — Medication 600 MILLIGRAM(S): at 23:50

## 2023-10-26 RX ADMIN — Medication 750 MILLIGRAM(S): at 21:56

## 2023-10-26 RX ADMIN — Medication 2 MILLIGRAM(S): at 21:58

## 2023-10-26 RX ADMIN — Medication 600 MILLIGRAM(S): at 23:09

## 2023-10-26 RX ADMIN — Medication 750 MILLIGRAM(S): at 12:08

## 2023-10-26 RX ADMIN — Medication 2 MILLIGRAM(S): at 16:15

## 2023-10-26 RX ADMIN — SODIUM CHLORIDE 1 GRAM(S): 9 INJECTION INTRAMUSCULAR; INTRAVENOUS; SUBCUTANEOUS at 12:09

## 2023-10-26 RX ADMIN — Medication 1 MILLIGRAM(S): at 21:56

## 2023-10-26 NOTE — BH INPATIENT PSYCHIATRY PROGRESS NOTE - CURRENT MEDICATION
MEDICATIONS  (STANDING):  benztropine 1 milliGRAM(s) Oral two times a day  nicotine -  14 mG/24Hr(s) Patch 1 Patch Transdermal daily  QUEtiapine 200 milliGRAM(s) Oral at bedtime  sodium chloride 1 Gram(s) Oral daily  valproic acid 750 milliGRAM(s) Oral two times a day    MEDICATIONS  (PRN):  acetaminophen     Tablet .. 650 milliGRAM(s) Oral every 6 hours PRN Moderate Pain (4 - 6)  haloperidol     Tablet 5 milliGRAM(s) Oral every 6 hours PRN agitation  ibuprofen  Tablet. 600 milliGRAM(s) Oral every 6 hours PRN Severe Pain (7 - 10)  nicotine  Polacrilex Lozenge 2 milliGRAM(s) Oral every 2 hours PRN NRT   MEDICATIONS  (STANDING):    MEDICATIONS  (PRN):

## 2023-10-26 NOTE — BH INPATIENT PSYCHIATRY PROGRESS NOTE - NSBHASSESSSUMMFT_PSY_ALL_CORE
54 yo male, domiciled at residence, followed by [facility], with PPH of schizoaffective disorder on prolixin 62.5 mg given on 10/09 per the ED collateral at [facility]. He was brought to the ED after being found going through maillboxes and demonstrating disorganized behavior out in the community. On assessment today, notable symptoms included intermittent auditory hallucinations and, yesterday, the patient reported paranoia and delusional content including the belief that his food was poisoned, as well as mild agitation. He persistently denies SI/HI, intent and plan. He declined to provide any details on further assessment. He reports no other acute complaints at this time. He has not been a behavioral issue on the unit with no aggressive behaviors.     Plan:  # Schizoaffective disorder, bipolar type   - Depakote switched to Depakene due to possible medication diversion (depakote level subtherapeutic 49.5 on 10/25/23), and increased Depakene to 750 mg orally twice daily from 500 mg orally twice daily for mood stabilization   - Continue with Seroquel 200 mg orally nightly  - Continue with Cogentin 1 mg orally BID for EPS/acute dystonia   - Depakote level 10/25/23 in am was 49.5 (sub-therapeutic)   - Prolixin 62.5 mg KIDD ordered for today 10/25/23   - Zyprexa/Ativan orally PRN for agitation and anxiety respectively   - Legal status 9.27     #Nicotine dependence   - Continue with 14 mg patch q24H     #Chronic asymptomatic hyponatremia  - Start at home dose of 1 g sodium chloride, patient Na consistently 130, patient asymptomatic    #Left ankle swelling  - Xrays ordered, resulted   - Ortho consult to be placed tomorrow

## 2023-10-26 NOTE — BH INPATIENT PSYCHIATRY PROGRESS NOTE - NSBHFUPINTERVALHXFT_PSY_A_CORE
Chart and nursing notes reviewed. No acute events overnight. No new complaints. Patient cooperative. Adherent to standing medications. Slightly more irritable today than in previous encounters. Became upset with writer after he told him he could not take medication with him to room to take later (had attempted to take his nicotine lozenge with him to his room). Also became upset and stated "why cant I have my own rights?" When writer asked patient if he was in agreement with increase in Depakote and switch to Depakene due to concerns of possibly diverting medications. He initially stated "I don't need the Depakote" but ultimately agreed and was calm and cooperative. Noted several times throughout day, talking to self, laughing to self, smiling inappropriately but otherwise in good behavioral control. Ortho saw patient, recs appreciated, nothing acute for his leg. Reports auditory hallucinations, oddly related, and chronic delusions, which appear close to baseline. Disorganized at times in his thought process/content, denies visual hallucinations, denies any suicidal or homicidal ideation, intent, or plan.     Writer spoke to RN from patient's ACT team, Judy, regarding update. She stated that from the update he appeared closer to baseline, and endorsed his alcohol and recent meth use as a precipitating factor for psychiatric decompensation. Requested they be notified prior to discharge, which was projected for early next week, they are in agreement with plan.     Patient to receive Prolixin KIDD 62.5 mg today.   Depakote switched to Depakene and dose increased to 750 mg orally twice daily from 500 mg orally twice daily   Podiatry consulted, recs to be appreciated  Podiatry consulted. Switched to depakene yesterday as cheeking suspected. Pt likely close to baseline though and for discharge next week. Got prolixin dec 62.5 mg yesterday.

## 2023-10-26 NOTE — BH INPATIENT PSYCHIATRY PROGRESS NOTE - NSBHMETABOLIC_PSY_ALL_CORE_FT
BMI: BMI (kg/m2): 29 (10-14-23 @ 04:31)  HbA1c: A1C with Estimated Average Glucose Result: 5.2 % (10-17-23 @ 11:13)    Glucose:   BP: 165/93 (10-26-23 @ 16:36) (126/75 - 165/93)  Lipid Panel: Date/Time: 10-17-23 @ 11:13  Cholesterol, Serum: 165  Direct LDL: --  HDL Cholesterol, Serum: 58  Total Cholesterol/HDL Ration Measurement: --  Triglycerides, Serum: 93   BMI: BMI (kg/m2): 29 (10-14-23 @ 04:31)  HbA1c: A1C with Estimated Average Glucose Result: 5.2 % (10-17-23 @ 11:13)    Glucose:   BP: 117/79 (11-01-23 @ 08:46) (117/79 - 152/86)  Lipid Panel: Date/Time: 10-17-23 @ 11:13  Cholesterol, Serum: 165  Direct LDL: --  HDL Cholesterol, Serum: 58  Total Cholesterol/HDL Ration Measurement: --  Triglycerides, Serum: 93

## 2023-10-26 NOTE — BH INPATIENT PSYCHIATRY PROGRESS NOTE - NSBHCHARTREVIEWVS_PSY_A_CORE FT
Vital Signs Last 24 Hrs  T(C): 37.3 (10-26-23 @ 16:36), Max: 37.3 (10-26-23 @ 16:36)  T(F): 99.1 (10-26-23 @ 16:36), Max: 99.1 (10-26-23 @ 16:36)  HR: 76 (10-26-23 @ 16:36) (71 - 76)  BP: 165/93 (10-26-23 @ 16:36) (135/82 - 165/93)  BP(mean): --  RR: 18 (10-26-23 @ 16:36) (18 - 18)  SpO2: 97% (10-26-23 @ 16:36) (95% - 97%)     Vital Signs Last 24 Hrs  T(C): --  T(F): --  HR: --  BP: --  BP(mean): --  RR: --  SpO2: --

## 2023-10-27 PROCEDURE — 99232 SBSQ HOSP IP/OBS MODERATE 35: CPT

## 2023-10-27 RX ADMIN — Medication 1 MILLIGRAM(S): at 10:58

## 2023-10-27 RX ADMIN — Medication 750 MILLIGRAM(S): at 10:57

## 2023-10-27 RX ADMIN — Medication 2 MILLIGRAM(S): at 10:57

## 2023-10-27 RX ADMIN — Medication 600 MILLIGRAM(S): at 14:37

## 2023-10-27 RX ADMIN — Medication 600 MILLIGRAM(S): at 15:00

## 2023-10-27 RX ADMIN — QUETIAPINE FUMARATE 200 MILLIGRAM(S): 200 TABLET, FILM COATED ORAL at 21:30

## 2023-10-27 RX ADMIN — Medication 2 MILLIGRAM(S): at 14:37

## 2023-10-27 RX ADMIN — Medication 750 MILLIGRAM(S): at 21:30

## 2023-10-27 RX ADMIN — Medication 650 MILLIGRAM(S): at 11:30

## 2023-10-27 RX ADMIN — Medication 1 MILLIGRAM(S): at 21:30

## 2023-10-27 RX ADMIN — SODIUM CHLORIDE 1 GRAM(S): 9 INJECTION INTRAMUSCULAR; INTRAVENOUS; SUBCUTANEOUS at 10:58

## 2023-10-27 RX ADMIN — Medication 650 MILLIGRAM(S): at 10:59

## 2023-10-27 NOTE — BH INPATIENT PSYCHIATRY PROGRESS NOTE - NSBHCHARTREVIEWVS_PSY_A_CORE FT
Vital Signs Last 24 Hrs  T(C): 37.3 (10-26-23 @ 16:36), Max: 37.3 (10-26-23 @ 16:36)  T(F): 99.1 (10-26-23 @ 16:36), Max: 99.1 (10-26-23 @ 16:36)  HR: 76 (10-26-23 @ 16:36) (76 - 76)  BP: 165/93 (10-26-23 @ 16:36) (165/93 - 165/93)  RR: 18 (10-26-23 @ 16:36) (18 - 18)  SpO2: 97% (10-26-23 @ 16:36) (97% - 97%)     Vital Signs Last 24 Hrs  T(C): 37.3 (10-26-23 @ 16:36), Max: 37.3 (10-26-23 @ 16:36)  T(F): 99.1 (10-26-23 @ 16:36), Max: 99.1 (10-26-23 @ 16:36)  HR: 76 (10-26-23 @ 16:36) (76 - 76)  BP: 165/93 (10-26-23 @ 16:36) (165/93 - 165/93)  BP(mean): --  RR: 18 (10-26-23 @ 16:36) (18 - 18)  SpO2: 97% (10-26-23 @ 16:36) (97% - 97%)     Vital Signs Last 24 Hrs  T(C): --  T(F): --  HR: --  BP: --  BP(mean): --  RR: --  SpO2: --

## 2023-10-27 NOTE — BH INPATIENT PSYCHIATRY PROGRESS NOTE - NSBHASSESSSUMMFT_PSY_ALL_CORE
52 yo male, domiciled at residence, followed by [facility], with PPH of schizoaffective disorder on prolixin 62.5 mg given on 10/09 per the ED collateral at [facility]. He was brought to the ED after being found going through maillboxes and demonstrating disorganized behavior out in the community. On assessment today, notable symptoms included intermittent auditory hallucinations and, yesterday, the patient reported paranoia and delusional content including the belief that his food was poisoned, as well as mild agitation. He persistently denies SI/HI, intent and plan. He declined to provide any details on further assessment. He reports no other acute complaints at this time. He has not been a behavioral issue on the unit with no aggressive behaviors.     Plan:  # Schizoaffective disorder, bipolar type   - Continue Depakene 750 mg orally twice daily for mood stabilization; repeat level for 10/30/23 in am  - Continue with Seroquel 200 mg orally nightly  - Continue with Cogentin 1 mg orally BID for EPS/acute dystonia   - Depakote level 10/25/23 in am was 49.5 (sub-therapeutic)   - Prolixin 62.5 mg KIDD ordered for today 10/25/23   - Zyprexa/Ativan orally PRN for agitation and anxiety respectively   - Patient projected for discharge early next week; treatment team is in contact with patient's ACT team   - Legal status 9.27     #Nicotine dependence   - Continue with 14 mg patch q24H     #Chronic asymptomatic hyponatremia  - Start at home dose of 1 g sodium chloride, patient Na consistently 130, patient asymptomatic    #Left ankle swelling  - Xrays ordered, resulted   - Ortho recs appreciated    52 yo male, domiciled at residence, followed by [facility], with PPH of schizoaffective disorder on prolixin 62.5 mg given on 10/09 per the ED collateral at [facility]. He was brought to the ED after being found going through maillboxes and demonstrating disorganized behavior out in the community. On assessment today, notable symptoms included intermittent auditory hallucinations and, yesterday, the patient reported paranoia and delusional content including the belief that his food was poisoned, as well as mild agitation. He persistently denies SI/HI, intent and plan. He declined to provide any details on further assessment. He reports no other acute complaints at this time. He has not been a behavioral issue on the unit with no aggressive behaviors.     Plan:  # Schizoaffective disorder, bipolar type   - Continue Depakene 750 mg orally twice daily for mood stabilization; repeat level for 10/30/23 in am  - Continue with Seroquel 200 mg orally nightly  - Continue with Cogentin 1 mg orally BID for EPS/acute dystonia   - Depakote level 10/25/23 in am was 49.5 (sub-therapeutic)   - Prolixin 62.5 mg KIDD given on 10/25/23; patient is next due for maintenance dose KIDD [id number] on 11/8/23   - Zyprexa/Ativan orally PRN for agitation and anxiety respectively   - Patient projected for discharge early next week; treatment team is in contact with patient's ACT team   - Legal status 9.27     #Nicotine dependence   - Continue with 14 mg patch q24H     #Chronic asymptomatic hyponatremia  - Start at home dose of 1 g sodium chloride, patient Na consistently 130, patient asymptomatic    #Left ankle swelling  - Xrays ordered, resulted   - Ortho recs appreciated

## 2023-10-27 NOTE — BH INPATIENT PSYCHIATRY PROGRESS NOTE - NSBHFUPINTERVALHXFT_PSY_A_CORE
Chart and nursing notes reviewed. No acute events overnight. No new complaints. Patient calm, cooperative, adherent to standing medications. As per staff has been irritable at times, but in good behavioral control and easily redirectable. He stated he was in agreement with plan to allow for titration of Depakene with a repeat level on Monday in AM, and then discharge early next week. He also recevied his Prolixin KIDD on 10/25/23, reports no issues. Continues to be noted walking in hallway, talking and laughing to self, smiling inappropriately at times, but otherwise in good behavioral control. Reports a decrease in chronic auditory hallucinations and chronic delusions, appears to be at baseline. Disorganized at times in his thought process/content, denies visual hallucinations, denies any suicidal or homicidal ideation, intent, or plan.     Podiatry consulted, recs to be appreciated

## 2023-10-27 NOTE — BH INPATIENT PSYCHIATRY PROGRESS NOTE - NSBHMETABOLIC_PSY_ALL_CORE_FT
BMI: BMI (kg/m2): 29 (10-14-23 @ 04:31)  HbA1c: A1C with Estimated Average Glucose Result: 5.2 % (10-17-23 @ 11:13)  Glucose: 88   BP: 165/93 (10-26-23 @ 16:36) (127/79 - 165/93)  Lipid Panel: Date/Time: 10-17-23 @ 11:13  Cholesterol, Serum: 165  Direct LDL: 88  HDL Cholesterol, Serum: 58  Triglycerides, Serum: 93   BMI: BMI (kg/m2): 29 (10-14-23 @ 04:31)  HbA1c: A1C with Estimated Average Glucose Result: 5.2 % (10-17-23 @ 11:13)    Glucose:   BP: 165/93 (10-26-23 @ 16:36) (127/79 - 165/93)  Lipid Panel: Date/Time: 10-17-23 @ 11:13  Cholesterol, Serum: 165  Direct LDL: --  HDL Cholesterol, Serum: 58  Total Cholesterol/HDL Ration Measurement: --  Triglycerides, Serum: 93   BMI: BMI (kg/m2): 29 (10-14-23 @ 04:31)  HbA1c: A1C with Estimated Average Glucose Result: 5.2 % (10-17-23 @ 11:13)    Glucose:   BP: 117/79 (11-01-23 @ 08:46) (117/79 - 152/86)  Lipid Panel: Date/Time: 10-17-23 @ 11:13  Cholesterol, Serum: 165  Direct LDL: --  HDL Cholesterol, Serum: 58  Total Cholesterol/HDL Ration Measurement: --  Triglycerides, Serum: 93

## 2023-10-28 RX ADMIN — Medication 1 MILLIGRAM(S): at 09:56

## 2023-10-28 RX ADMIN — Medication 750 MILLIGRAM(S): at 21:19

## 2023-10-28 RX ADMIN — Medication 750 MILLIGRAM(S): at 09:57

## 2023-10-28 RX ADMIN — Medication 600 MILLIGRAM(S): at 09:59

## 2023-10-28 RX ADMIN — SODIUM CHLORIDE 1 GRAM(S): 9 INJECTION INTRAMUSCULAR; INTRAVENOUS; SUBCUTANEOUS at 09:57

## 2023-10-28 RX ADMIN — Medication 600 MILLIGRAM(S): at 22:31

## 2023-10-28 RX ADMIN — Medication 2 MILLIGRAM(S): at 10:00

## 2023-10-28 RX ADMIN — Medication 2 MILLIGRAM(S): at 21:20

## 2023-10-28 RX ADMIN — Medication 2 MILLIGRAM(S): at 14:50

## 2023-10-28 RX ADMIN — QUETIAPINE FUMARATE 200 MILLIGRAM(S): 200 TABLET, FILM COATED ORAL at 21:19

## 2023-10-28 RX ADMIN — Medication 1 MILLIGRAM(S): at 21:19

## 2023-10-28 RX ADMIN — Medication 600 MILLIGRAM(S): at 10:59

## 2023-10-28 RX ADMIN — Medication 600 MILLIGRAM(S): at 23:30

## 2023-10-29 RX ADMIN — Medication 2 MILLIGRAM(S): at 12:39

## 2023-10-29 RX ADMIN — Medication 1 MILLIGRAM(S): at 21:44

## 2023-10-29 RX ADMIN — Medication 2 MILLIGRAM(S): at 21:45

## 2023-10-29 RX ADMIN — Medication 750 MILLIGRAM(S): at 21:45

## 2023-10-29 RX ADMIN — Medication 1 MILLIGRAM(S): at 10:38

## 2023-10-29 RX ADMIN — Medication 2 MILLIGRAM(S): at 16:55

## 2023-10-29 RX ADMIN — Medication 2 MILLIGRAM(S): at 10:39

## 2023-10-29 RX ADMIN — Medication 2 MILLIGRAM(S): at 14:46

## 2023-10-29 RX ADMIN — SODIUM CHLORIDE 1 GRAM(S): 9 INJECTION INTRAMUSCULAR; INTRAVENOUS; SUBCUTANEOUS at 10:38

## 2023-10-29 RX ADMIN — QUETIAPINE FUMARATE 200 MILLIGRAM(S): 200 TABLET, FILM COATED ORAL at 21:44

## 2023-10-29 RX ADMIN — Medication 750 MILLIGRAM(S): at 10:38

## 2023-10-30 LAB
ALBUMIN SERPL ELPH-MCNC: 4.4 G/DL — SIGNIFICANT CHANGE UP (ref 3.3–5)
ALBUMIN SERPL ELPH-MCNC: 4.4 G/DL — SIGNIFICANT CHANGE UP (ref 3.3–5)
ALP SERPL-CCNC: 56 U/L — SIGNIFICANT CHANGE UP (ref 40–120)
ALP SERPL-CCNC: 56 U/L — SIGNIFICANT CHANGE UP (ref 40–120)
ALT FLD-CCNC: 15 U/L — SIGNIFICANT CHANGE UP (ref 10–45)
ALT FLD-CCNC: 15 U/L — SIGNIFICANT CHANGE UP (ref 10–45)
ANION GAP SERPL CALC-SCNC: 12 MMOL/L — SIGNIFICANT CHANGE UP (ref 5–17)
ANION GAP SERPL CALC-SCNC: 12 MMOL/L — SIGNIFICANT CHANGE UP (ref 5–17)
AST SERPL-CCNC: 21 U/L — SIGNIFICANT CHANGE UP (ref 10–40)
AST SERPL-CCNC: 21 U/L — SIGNIFICANT CHANGE UP (ref 10–40)
BASOPHILS # BLD AUTO: 0.03 K/UL — SIGNIFICANT CHANGE UP (ref 0–0.2)
BASOPHILS # BLD AUTO: 0.03 K/UL — SIGNIFICANT CHANGE UP (ref 0–0.2)
BASOPHILS NFR BLD AUTO: 0.3 % — SIGNIFICANT CHANGE UP (ref 0–2)
BASOPHILS NFR BLD AUTO: 0.3 % — SIGNIFICANT CHANGE UP (ref 0–2)
BILIRUB SERPL-MCNC: 0.4 MG/DL — SIGNIFICANT CHANGE UP (ref 0.2–1.2)
BILIRUB SERPL-MCNC: 0.4 MG/DL — SIGNIFICANT CHANGE UP (ref 0.2–1.2)
BUN SERPL-MCNC: 9 MG/DL — SIGNIFICANT CHANGE UP (ref 7–23)
BUN SERPL-MCNC: 9 MG/DL — SIGNIFICANT CHANGE UP (ref 7–23)
CALCIUM SERPL-MCNC: 9.4 MG/DL — SIGNIFICANT CHANGE UP (ref 8.4–10.5)
CALCIUM SERPL-MCNC: 9.4 MG/DL — SIGNIFICANT CHANGE UP (ref 8.4–10.5)
CHLORIDE SERPL-SCNC: 92 MMOL/L — LOW (ref 96–108)
CHLORIDE SERPL-SCNC: 92 MMOL/L — LOW (ref 96–108)
CO2 SERPL-SCNC: 23 MMOL/L — SIGNIFICANT CHANGE UP (ref 22–31)
CO2 SERPL-SCNC: 23 MMOL/L — SIGNIFICANT CHANGE UP (ref 22–31)
CREAT SERPL-MCNC: 0.76 MG/DL — SIGNIFICANT CHANGE UP (ref 0.5–1.3)
CREAT SERPL-MCNC: 0.76 MG/DL — SIGNIFICANT CHANGE UP (ref 0.5–1.3)
EGFR: 109 ML/MIN/1.73M2 — SIGNIFICANT CHANGE UP
EGFR: 109 ML/MIN/1.73M2 — SIGNIFICANT CHANGE UP
EOSINOPHIL # BLD AUTO: 0.07 K/UL — SIGNIFICANT CHANGE UP (ref 0–0.5)
EOSINOPHIL # BLD AUTO: 0.07 K/UL — SIGNIFICANT CHANGE UP (ref 0–0.5)
EOSINOPHIL NFR BLD AUTO: 0.7 % — SIGNIFICANT CHANGE UP (ref 0–6)
EOSINOPHIL NFR BLD AUTO: 0.7 % — SIGNIFICANT CHANGE UP (ref 0–6)
GLUCOSE SERPL-MCNC: 89 MG/DL — SIGNIFICANT CHANGE UP (ref 70–99)
GLUCOSE SERPL-MCNC: 89 MG/DL — SIGNIFICANT CHANGE UP (ref 70–99)
HCT VFR BLD CALC: 41.5 % — SIGNIFICANT CHANGE UP (ref 39–50)
HCT VFR BLD CALC: 41.5 % — SIGNIFICANT CHANGE UP (ref 39–50)
HGB BLD-MCNC: 14.8 G/DL — SIGNIFICANT CHANGE UP (ref 13–17)
HGB BLD-MCNC: 14.8 G/DL — SIGNIFICANT CHANGE UP (ref 13–17)
IMM GRANULOCYTES NFR BLD AUTO: 0.6 % — SIGNIFICANT CHANGE UP (ref 0–0.9)
IMM GRANULOCYTES NFR BLD AUTO: 0.6 % — SIGNIFICANT CHANGE UP (ref 0–0.9)
LYMPHOCYTES # BLD AUTO: 2.75 K/UL — SIGNIFICANT CHANGE UP (ref 1–3.3)
LYMPHOCYTES # BLD AUTO: 2.75 K/UL — SIGNIFICANT CHANGE UP (ref 1–3.3)
LYMPHOCYTES # BLD AUTO: 28.3 % — SIGNIFICANT CHANGE UP (ref 13–44)
LYMPHOCYTES # BLD AUTO: 28.3 % — SIGNIFICANT CHANGE UP (ref 13–44)
MCHC RBC-ENTMCNC: 32.6 PG — SIGNIFICANT CHANGE UP (ref 27–34)
MCHC RBC-ENTMCNC: 32.6 PG — SIGNIFICANT CHANGE UP (ref 27–34)
MCHC RBC-ENTMCNC: 35.7 GM/DL — SIGNIFICANT CHANGE UP (ref 32–36)
MCHC RBC-ENTMCNC: 35.7 GM/DL — SIGNIFICANT CHANGE UP (ref 32–36)
MCV RBC AUTO: 91.4 FL — SIGNIFICANT CHANGE UP (ref 80–100)
MCV RBC AUTO: 91.4 FL — SIGNIFICANT CHANGE UP (ref 80–100)
MONOCYTES # BLD AUTO: 0.73 K/UL — SIGNIFICANT CHANGE UP (ref 0–0.9)
MONOCYTES # BLD AUTO: 0.73 K/UL — SIGNIFICANT CHANGE UP (ref 0–0.9)
MONOCYTES NFR BLD AUTO: 7.5 % — SIGNIFICANT CHANGE UP (ref 2–14)
MONOCYTES NFR BLD AUTO: 7.5 % — SIGNIFICANT CHANGE UP (ref 2–14)
NEUTROPHILS # BLD AUTO: 6.07 K/UL — SIGNIFICANT CHANGE UP (ref 1.8–7.4)
NEUTROPHILS # BLD AUTO: 6.07 K/UL — SIGNIFICANT CHANGE UP (ref 1.8–7.4)
NEUTROPHILS NFR BLD AUTO: 62.6 % — SIGNIFICANT CHANGE UP (ref 43–77)
NEUTROPHILS NFR BLD AUTO: 62.6 % — SIGNIFICANT CHANGE UP (ref 43–77)
NRBC # BLD: 0 /100 WBCS — SIGNIFICANT CHANGE UP (ref 0–0)
NRBC # BLD: 0 /100 WBCS — SIGNIFICANT CHANGE UP (ref 0–0)
PLATELET # BLD AUTO: 307 K/UL — SIGNIFICANT CHANGE UP (ref 150–400)
PLATELET # BLD AUTO: 307 K/UL — SIGNIFICANT CHANGE UP (ref 150–400)
POTASSIUM SERPL-MCNC: 4.2 MMOL/L — SIGNIFICANT CHANGE UP (ref 3.5–5.3)
POTASSIUM SERPL-MCNC: 4.2 MMOL/L — SIGNIFICANT CHANGE UP (ref 3.5–5.3)
POTASSIUM SERPL-SCNC: 4.2 MMOL/L — SIGNIFICANT CHANGE UP (ref 3.5–5.3)
POTASSIUM SERPL-SCNC: 4.2 MMOL/L — SIGNIFICANT CHANGE UP (ref 3.5–5.3)
PROT SERPL-MCNC: 7.1 G/DL — SIGNIFICANT CHANGE UP (ref 6–8.3)
PROT SERPL-MCNC: 7.1 G/DL — SIGNIFICANT CHANGE UP (ref 6–8.3)
RBC # BLD: 4.54 M/UL — SIGNIFICANT CHANGE UP (ref 4.2–5.8)
RBC # BLD: 4.54 M/UL — SIGNIFICANT CHANGE UP (ref 4.2–5.8)
RBC # FLD: 12.3 % — SIGNIFICANT CHANGE UP (ref 10.3–14.5)
RBC # FLD: 12.3 % — SIGNIFICANT CHANGE UP (ref 10.3–14.5)
SODIUM SERPL-SCNC: 127 MMOL/L — LOW (ref 135–145)
SODIUM SERPL-SCNC: 127 MMOL/L — LOW (ref 135–145)
VALPROATE SERPL-MCNC: 56.3 UG/ML — SIGNIFICANT CHANGE UP (ref 50–100)
VALPROATE SERPL-MCNC: 56.3 UG/ML — SIGNIFICANT CHANGE UP (ref 50–100)
WBC # BLD: 9.71 K/UL — SIGNIFICANT CHANGE UP (ref 3.8–10.5)
WBC # BLD: 9.71 K/UL — SIGNIFICANT CHANGE UP (ref 3.8–10.5)
WBC # FLD AUTO: 9.71 K/UL — SIGNIFICANT CHANGE UP (ref 3.8–10.5)
WBC # FLD AUTO: 9.71 K/UL — SIGNIFICANT CHANGE UP (ref 3.8–10.5)

## 2023-10-30 PROCEDURE — 99231 SBSQ HOSP IP/OBS SF/LOW 25: CPT

## 2023-10-30 RX ORDER — SODIUM CHLORIDE 9 MG/ML
1 INJECTION INTRAMUSCULAR; INTRAVENOUS; SUBCUTANEOUS
Refills: 0 | Status: DISCONTINUED | OUTPATIENT
Start: 2023-10-30 | End: 2023-11-01

## 2023-10-30 RX ADMIN — SODIUM CHLORIDE 1 GRAM(S): 9 INJECTION INTRAMUSCULAR; INTRAVENOUS; SUBCUTANEOUS at 20:52

## 2023-10-30 RX ADMIN — Medication 2 MILLIGRAM(S): at 12:51

## 2023-10-30 RX ADMIN — QUETIAPINE FUMARATE 200 MILLIGRAM(S): 200 TABLET, FILM COATED ORAL at 20:53

## 2023-10-30 RX ADMIN — Medication 600 MILLIGRAM(S): at 07:15

## 2023-10-30 RX ADMIN — Medication 600 MILLIGRAM(S): at 06:15

## 2023-10-30 RX ADMIN — Medication 2 MILLIGRAM(S): at 20:53

## 2023-10-30 RX ADMIN — SODIUM CHLORIDE 1 GRAM(S): 9 INJECTION INTRAMUSCULAR; INTRAVENOUS; SUBCUTANEOUS at 10:22

## 2023-10-30 RX ADMIN — Medication 2 MILLIGRAM(S): at 10:22

## 2023-10-30 RX ADMIN — Medication 750 MILLIGRAM(S): at 20:51

## 2023-10-30 RX ADMIN — Medication 2 MILLIGRAM(S): at 06:16

## 2023-10-30 RX ADMIN — Medication 1 MILLIGRAM(S): at 20:52

## 2023-10-30 RX ADMIN — Medication 1 MILLIGRAM(S): at 10:22

## 2023-10-30 RX ADMIN — Medication 750 MILLIGRAM(S): at 10:22

## 2023-10-30 RX ADMIN — Medication 2 MILLIGRAM(S): at 17:29

## 2023-10-30 NOTE — BH INPATIENT PSYCHIATRY PROGRESS NOTE - NSBHMETABOLIC_PSY_ALL_CORE_FT
BMI: BMI (kg/m2): 29 (10-14-23 @ 04:31)  HbA1c: A1C with Estimated Average Glucose Result: 5.2 % (10-17-23 @ 11:13)  Glucose: 89  BP: 112/74 (10-30-23 @ 07:49) (112/74 - 145/96)  Lipid Panel: Date/Time: 10-17-23 @ 11:13  Cholesterol, Serum: 165  Direct LDL: 89  HDL Cholesterol, Serum: 58  Triglycerides, Serum: 93   BMI: BMI (kg/m2): 29 (10-14-23 @ 04:31)  HbA1c: A1C with Estimated Average Glucose Result: 5.2 % (10-17-23 @ 11:13)    Glucose:   BP: 117/79 (11-01-23 @ 08:46) (117/79 - 152/86)  Lipid Panel: Date/Time: 10-17-23 @ 11:13  Cholesterol, Serum: 165  Direct LDL: --  HDL Cholesterol, Serum: 58  Total Cholesterol/HDL Ration Measurement: --  Triglycerides, Serum: 93

## 2023-10-30 NOTE — BH INPATIENT PSYCHIATRY PROGRESS NOTE - NSBHASSESSSUMMFT_PSY_ALL_CORE
52 yo male, domiciled at residence, followed by [facility], with PPH of schizoaffective disorder on prolixin 62.5 mg given on 10/09 per the ED collateral at [facility]. He was brought to the ED after being found going through maillboxes and demonstrating disorganized behavior out in the community. On assessment today, notable symptoms included intermittent auditory hallucinations and, yesterday, the patient reported paranoia and delusional content including the belief that his food was poisoned, as well as mild agitation. He persistently denies SI/HI, intent and plan. He declined to provide any details on further assessment. He reports no other acute complaints at this time. He has not been a behavioral issue on the unit with no aggressive behaviors.     Plan:  # Schizoaffective disorder, bipolar type   - Continue Depakene 750 mg orally twice daily for mood stabilization; repeat level for 10/30/23 in am  - Continue with Seroquel 200 mg orally nightly  - Continue with Cogentin 1 mg orally BID for EPS/acute dystonia   - Depakote level 10/25/23 in am was 49.5 (sub-therapeutic); 10/30/23, 56.3   - Prolixin 62.5 mg KIDD given on 10/25/23; patient is next due for maintenance dose KIDD [id number] on 11/8/23   - Zyprexa/Ativan orally PRN for agitation and anxiety respectively   - Patient planned for discharge early 11/1/23; treatment team is in contact with patient's ACT team   - Legal status 9.27     #Nicotine dependence   - Continue with 14 mg patch q24H     #Chronic asymptomatic hyponatremia  - Start at home dose of 1 g sodium chloride, patient Na consistently 130, patient asymptomatic    #Left ankle swelling  - Xrays ordered, resulted   - Ortho recs appreciated

## 2023-10-30 NOTE — BH INPATIENT PSYCHIATRY PROGRESS NOTE - NSBHFUPINTERVALHXFT_PSY_A_CORE
Chart and nursing notes reviewed. No acute events overnight. No new complaints. Patient calm, cooperative, adherent to standing medications. Patient irritable at times on unit, redirectable, pleasant with staff. In good behavioral control, chronic delusions, internally preoccupied at times, and states he "sometimes" hears voices. Depakote level 10/30/23, 54.6. Na level today 127, patient remains asymptomatic, medicine called, recs appreciated, increased sodium chloride tablets to 1 g twice daily from 1 g daily. Will continue to trend and monitor. Prolixin 62.5 mg KIDD given on 10/25/23, he is next due for his Prolixin 62.5 mg KIDD on 11/8/23. Disorganized at times in his thought process/content, denies visual hallucinations, denies any suicidal or homicidal ideation, intent, or plan. Appears to be at his psychiatric baseline, his team is aware of planned discharge for Wednesday, 11/1/23. Medications sent to patient's preferred pharmacy.

## 2023-10-30 NOTE — BH INPATIENT PSYCHIATRY PROGRESS NOTE - NSBHCHARTREVIEWVS_PSY_A_CORE FT
Vital Signs Last 24 Hrs  T(C): 37.4 (10-30-23 @ 07:49), Max: 37.6 (10-29-23 @ 17:22)  T(F): 99.4 (10-30-23 @ 07:49), Max: 99.7 (10-29-23 @ 17:22)  HR: 69 (10-30-23 @ 07:49) (67 - 69)  BP: 112/74 (10-30-23 @ 07:49) (112/74 - 133/86)  BP(mean): --  RR: 18 (10-29-23 @ 17:22) (18 - 18)  SpO2: 94% (10-30-23 @ 07:49) (94% - 96%)     Vital Signs Last 24 Hrs  T(C): --  T(F): --  HR: --  BP: --  BP(mean): --  RR: --  SpO2: --

## 2023-10-30 NOTE — BH INPATIENT PSYCHIATRY PROGRESS NOTE - CURRENT MEDICATION
MEDICATIONS  (STANDING):  benztropine 1 milliGRAM(s) Oral two times a day  nicotine -  14 mG/24Hr(s) Patch 1 Patch Transdermal daily  QUEtiapine 200 milliGRAM(s) Oral at bedtime  sodium chloride 1 Gram(s) Oral two times a day  valproic acid 750 milliGRAM(s) Oral two times a day    MEDICATIONS  (PRN):  acetaminophen     Tablet .. 650 milliGRAM(s) Oral every 6 hours PRN Moderate Pain (4 - 6)  haloperidol     Tablet 5 milliGRAM(s) Oral every 6 hours PRN agitation  ibuprofen  Tablet. 600 milliGRAM(s) Oral every 6 hours PRN Severe Pain (7 - 10)  nicotine  Polacrilex Lozenge 2 milliGRAM(s) Oral every 2 hours PRN NRT   MEDICATIONS  (STANDING):    MEDICATIONS  (PRN):

## 2023-10-31 DIAGNOSIS — E87.1 HYPO-OSMOLALITY AND HYPONATREMIA: ICD-10-CM

## 2023-10-31 DIAGNOSIS — M25.572 PAIN IN LEFT ANKLE AND JOINTS OF LEFT FOOT: ICD-10-CM

## 2023-10-31 PROCEDURE — 99232 SBSQ HOSP IP/OBS MODERATE 35: CPT

## 2023-10-31 RX ORDER — BENZTROPINE MESYLATE 1 MG
1 TABLET ORAL
Qty: 60 | Refills: 0
Start: 2023-10-31 | End: 2023-11-29

## 2023-10-31 RX ORDER — VALPROIC ACID (AS SODIUM SALT) 250 MG/5ML
3 SOLUTION, ORAL ORAL
Qty: 0 | Refills: 0 | DISCHARGE
Start: 2023-10-31

## 2023-10-31 RX ORDER — VALPROIC ACID (AS SODIUM SALT) 250 MG/5ML
3 SOLUTION, ORAL ORAL
Qty: 180 | Refills: 0
Start: 2023-10-31 | End: 2023-11-29

## 2023-10-31 RX ORDER — SODIUM CHLORIDE 9 MG/ML
1 INJECTION INTRAMUSCULAR; INTRAVENOUS; SUBCUTANEOUS
Qty: 60 | Refills: 0
Start: 2023-10-31 | End: 2023-11-29

## 2023-10-31 RX ORDER — SODIUM CHLORIDE 9 MG/ML
1 INJECTION INTRAMUSCULAR; INTRAVENOUS; SUBCUTANEOUS
Qty: 0 | Refills: 0 | DISCHARGE
Start: 2023-10-31

## 2023-10-31 RX ORDER — BENZTROPINE MESYLATE 1 MG
1 TABLET ORAL
Qty: 0 | Refills: 0 | DISCHARGE
Start: 2023-10-31

## 2023-10-31 RX ORDER — QUETIAPINE FUMARATE 200 MG/1
1 TABLET, FILM COATED ORAL
Qty: 0 | Refills: 0 | DISCHARGE
Start: 2023-10-31

## 2023-10-31 RX ORDER — QUETIAPINE FUMARATE 200 MG/1
1 TABLET, FILM COATED ORAL
Qty: 30 | Refills: 0
Start: 2023-10-31 | End: 2023-11-29

## 2023-10-31 RX ADMIN — Medication 1 MILLIGRAM(S): at 22:03

## 2023-10-31 RX ADMIN — Medication 2 MILLIGRAM(S): at 16:43

## 2023-10-31 RX ADMIN — Medication 2 MILLIGRAM(S): at 11:48

## 2023-10-31 RX ADMIN — SODIUM CHLORIDE 1 GRAM(S): 9 INJECTION INTRAMUSCULAR; INTRAVENOUS; SUBCUTANEOUS at 10:24

## 2023-10-31 RX ADMIN — QUETIAPINE FUMARATE 200 MILLIGRAM(S): 200 TABLET, FILM COATED ORAL at 22:02

## 2023-10-31 RX ADMIN — Medication 2 MILLIGRAM(S): at 22:04

## 2023-10-31 RX ADMIN — Medication 750 MILLIGRAM(S): at 22:03

## 2023-10-31 RX ADMIN — SODIUM CHLORIDE 1 GRAM(S): 9 INJECTION INTRAMUSCULAR; INTRAVENOUS; SUBCUTANEOUS at 22:02

## 2023-10-31 RX ADMIN — Medication 1 MILLIGRAM(S): at 10:24

## 2023-10-31 RX ADMIN — Medication 750 MILLIGRAM(S): at 10:24

## 2023-10-31 RX ADMIN — Medication 2 MILLIGRAM(S): at 19:59

## 2023-10-31 NOTE — BH INPATIENT PSYCHIATRY PROGRESS NOTE - NSTXDCOPLKGOALOTHER_PSY_ALL_CORE
able to coordinate with ACT team

## 2023-10-31 NOTE — BH INPATIENT PSYCHIATRY PROGRESS NOTE - NSBHMETABOLIC_PSY_ALL_CORE_FT
BMI: BMI (kg/m2): 29 (10-14-23 @ 04:31)  HbA1c: A1C with Estimated Average Glucose Result: 5.2 % (10-17-23 @ 11:13)  Glucose: 89  BP: 112/74 (10-30-23 @ 07:49) (112/74 - 145/96)  Lipid Panel: Date/Time: 10-17-23 @ 11:13  Cholesterol, Serum: 165  Direct LDL: 89  HDL Cholesterol, Serum: 58  Triglycerides, Serum: 93   BMI: BMI (kg/m2): 29 (10-14-23 @ 04:31)  HbA1c: A1C with Estimated Average Glucose Result: 5.2 % (10-17-23 @ 11:13)    Glucose:   BP: 136/81 (10-31-23 @ 09:15) (112/74 - 145/96)  Lipid Panel: Date/Time: 10-17-23 @ 11:13  Cholesterol, Serum: 165  Direct LDL: --  HDL Cholesterol, Serum: 58  Total Cholesterol/HDL Ration Measurement: --  Triglycerides, Serum: 93

## 2023-10-31 NOTE — BH INPATIENT PSYCHIATRY PROGRESS NOTE - NSBHMSETHTPROC_PSY_A_CORE
Disorganized/Flight of ideas/Illogical/Impaired reasoning
Disorganized/Illogical/Impaired reasoning
Disorganized/Flight of ideas/Illogical/Impaired reasoning
Disorganized/Flight of ideas/Illogical/Impaired reasoning
Disorganized/Circumstantial/Impaired reasoning
Disorganized/Illogical/Impaired reasoning
Linear
Disorganized/Flight of ideas/Illogical/Impaired reasoning
Disorganized/Circumstantial/Impaired reasoning
Disorganized/Flight of ideas/Illogical/Impaired reasoning
Disorganized/Illogical/Impaired reasoning
Disorganized/Flight of ideas/Illogical/Impaired reasoning
Disorganized/Illogical/Impaired reasoning

## 2023-10-31 NOTE — BH INPATIENT PSYCHIATRY PROGRESS NOTE - NSTXDCOPLKPROGRES_PSY_ALL_CORE
Improving
No Change
Improving
Met - goal discontinued
Improving
Met - goal discontinued

## 2023-10-31 NOTE — BH INPATIENT PSYCHIATRY PROGRESS NOTE - NSBHCHARTREVIEWVS_PSY_A_CORE FT
Vital signs to be recorded, patient initially refused in am.      Vital Signs Last 24 Hrs  T(C): 36.8 (10-31-23 @ 09:15), Max: 36.8 (10-31-23 @ 09:15)  T(F): 98.2 (10-31-23 @ 09:15), Max: 98.2 (10-31-23 @ 09:15)  HR: 75 (10-31-23 @ 09:15) (75 - 75)  BP: 136/81 (10-31-23 @ 09:15) (136/81 - 136/81)  BP(mean): --  RR: 18 (10-31-23 @ 09:15) (18 - 18)  SpO2: 96% (10-31-23 @ 09:15) (96% - 96%)

## 2023-10-31 NOTE — BH INPATIENT PSYCHIATRY PROGRESS NOTE - NSTXDCOPLKGOAL_PSY_ALL_CORE
Other...

## 2023-10-31 NOTE — BH INPATIENT PSYCHIATRY PROGRESS NOTE - NSTXDCOPLKINTERMD_PSY_ALL_CORE
Get in touch with patient's ACT team 

## 2023-10-31 NOTE — BH INPATIENT PSYCHIATRY PROGRESS NOTE - NSTXCOPEGOALOTHER_PSY_ALL_CORE
Pt. will participate in groups and the milieu treatment structure of the unit.

## 2023-10-31 NOTE — BH INPATIENT PSYCHIATRY PROGRESS NOTE - NSBHMSEATTEN_PSY_A_CORE
Normal
Impaired
Normal
Normal
Impaired

## 2023-10-31 NOTE — BH INPATIENT PSYCHIATRY PROGRESS NOTE - NSTXPSYCHODATETRGT_PSY_ALL_CORE
23-Oct-2023
01-Nov-2023
23-Oct-2023
01-Nov-2023
23-Oct-2023

## 2023-10-31 NOTE — BH INPATIENT PSYCHIATRY PROGRESS NOTE - NSTXCOPEDATEEST_PSY_ALL_CORE
16-Oct-2023
14-Oct-2023
24-Oct-2023
16-Oct-2023
24-Oct-2023
24-Oct-2023
16-Oct-2023
16-Oct-2023
24-Oct-2023
24-Oct-2023
16-Oct-2023

## 2023-10-31 NOTE — BH INPATIENT PSYCHIATRY PROGRESS NOTE - NSTXCOPEPROGRES_PSY_ALL_CORE
Met - goal discontinued
Improving
Improving
Met - goal discontinued
No Change
Improving
No Change
Improving
No Change
Improving
Improving

## 2023-10-31 NOTE — BH INPATIENT PSYCHIATRY PROGRESS NOTE - OTHER
religiously preoccupied at times 

## 2023-10-31 NOTE — BH INPATIENT PSYCHIATRY PROGRESS NOTE - NSBHCONSULTIPREASON_PSY_A_CORE
danger to self; mental illness expected to respond to inpatient care
3
danger to self; mental illness expected to respond to inpatient care

## 2023-10-31 NOTE — BH INPATIENT PSYCHIATRY PROGRESS NOTE - NSTXIMPULSDATEEST_PSY_ALL_CORE
20-Oct-2023

## 2023-10-31 NOTE — BH INPATIENT PSYCHIATRY PROGRESS NOTE - NSDCCRITERIA_PSY_ALL_CORE
able to care for self outside of the hospital, decreased psychotic content, not endorsing any HI/SI, safe dispo 
able to care for self outside of the hospital, decreased psychotic content, not endorsing any HI/SI, safe dispo 
able to care for self outside of the hospital. 
able to care for self outside of the hospital, decreased psychotic content, not endorsing any HI/SI, safe dispo 

## 2023-10-31 NOTE — BH INPATIENT PSYCHIATRY PROGRESS NOTE - NSTXCOPEGOAL_PSY_ALL_CORE
Other...
Identify and utilize 2 coping skills that meet their needs
Identify and utilize 2 coping skills that meet their needs
Other...
Identify and utilize 2 coping skills that meet their needs
Other...
Identify and utilize 2 coping skills that meet their needs
Other...
Identify and utilize 2 coping skills that meet their needs
Other...
Identify and utilize 2 coping skills that meet their needs

## 2023-10-31 NOTE — BH INPATIENT PSYCHIATRY PROGRESS NOTE - NSTXDCOPLKDATETRGT_PSY_ALL_CORE
23-Oct-2023
30-Oct-2023
23-Oct-2023
30-Oct-2023
23-Oct-2023

## 2023-10-31 NOTE — BH INPATIENT PSYCHIATRY PROGRESS NOTE - NSTXIMPULSINTERMD_PSY_ALL_CORE
Depakote, Seroquel, Prolixin, Cogentin   Depakote subtherapeutic, dose increased and switched to depakene to ensure patient is not diverting medications 

## 2023-10-31 NOTE — BH INPATIENT PSYCHIATRY PROGRESS NOTE - NSCGIIMPROVESX_PSY_ALL_CORE
4 = No change - symptoms remain essentially unchanged
3 = Minimally improved - slightly better with little or no clinically meaningful reduction of symptoms.  Represents very little change in basic clinical status, level of care, or functional capacity.
2 = Much improved - notably better with signficant reduction of symptoms; increase in the level of functioning but some symptoms remain
3 = Minimally improved - slightly better with little or no clinically meaningful reduction of symptoms.  Represents very little change in basic clinical status, level of care, or functional capacity.
1 - Very much improved - nearly all better; good level of functioning; minimal symptoms; represents a very substantial change

## 2023-10-31 NOTE — BH INPATIENT PSYCHIATRY PROGRESS NOTE - NSTXDCOPLKDATEEST_PSY_ALL_CORE
16-Oct-2023

## 2023-10-31 NOTE — BH INPATIENT PSYCHIATRY PROGRESS NOTE - NSBHMSETHTCONTENT_PSY_A_CORE
Delusions/Other
Delusions/Other
Delusions
Delusions/Other
Delusions
Delusions/Other
Delusions
Delusions
Delusions/Other
Delusions
Delusions/Other

## 2023-10-31 NOTE — BH INPATIENT PSYCHIATRY PROGRESS NOTE - NSTXPSYCHOINTERMD_PSY_ALL_CORE
Continue with Seroquel, Depakote, Cogentin 
Continue with Seroquel, Depakote, Cogentin 
Depakote, Cogentin, Seroquel, and Prolixin KIDD 
Depakote, Cogentin, Seroquel, and Prolixin KIDD for tomorrow
Continue with Seroquel, Depakote, Cogentin 
Depakote, Cogentin, Seroquel, and Prolixin KIDD 

## 2023-10-31 NOTE — BH INPATIENT PSYCHIATRY PROGRESS NOTE - NSTXPSYCHOPROGRES_PSY_ALL_CORE
Improving
No Change
Improving
No Change
Improving
No Change
Improving
Improving

## 2023-10-31 NOTE — BH INPATIENT PSYCHIATRY PROGRESS NOTE - NSBHFUPINTERVALCCFT_PSY_A_CORE
Ongoing psychiatric evaluation and stabilization of schizoaffective disorder bipolar type 
"I feel fine."
Ongoing psychiatric evaluation and stabilization of schizoaffective disorder bipolar type   "I am doing great, I am leaving tomorrow" 
Ongoing psychiatric evaluation and stabilization of schizoaffective disorder bipolar type 

## 2023-10-31 NOTE — BH INPATIENT PSYCHIATRY PROGRESS NOTE - NSCGISEVERILLNESS_PSY_ALL_CORE
5 = Markedly ill - intrusive symptoms that distinctly impair social/occupational function or cause intrusive levels of distress
6 = Severely ill - disruptive pathology, behavior and function are frequently influenced by symptoms, may require assistance from others
4 = Moderately ill – overt symptoms causing noticeable, but modest, functional impairment or distress; symptom level may warrant medication
6 = Severely ill - disruptive pathology, behavior and function are frequently influenced by symptoms, may require assistance from others
5 = Markedly ill - intrusive symptoms that distinctly impair social/occupational function or cause intrusive levels of distress
6 = Severely ill - disruptive pathology, behavior and function are frequently influenced by symptoms, may require assistance from others
6 = Severely ill - disruptive pathology, behavior and function are frequently influenced by symptoms, may require assistance from others

## 2023-10-31 NOTE — BH INPATIENT PSYCHIATRY PROGRESS NOTE - NSTXIMPULSDATETRGT_PSY_ALL_CORE
24-Oct-2023
24-Oct-2023
01-Nov-2023
24-Oct-2023
01-Nov-2023

## 2023-10-31 NOTE — BH INPATIENT PSYCHIATRY PROGRESS NOTE - PRN MEDS
MEDICATIONS  (PRN):  acetaminophen     Tablet .. 650 milliGRAM(s) Oral every 6 hours PRN Moderate Pain (4 - 6)  haloperidol     Tablet 5 milliGRAM(s) Oral every 6 hours PRN agitation  ibuprofen  Tablet. 600 milliGRAM(s) Oral every 6 hours PRN Severe Pain (7 - 10)  nicotine  Polacrilex Lozenge 2 milliGRAM(s) Oral every 2 hours PRN NRT

## 2023-10-31 NOTE — BH INPATIENT PSYCHIATRY PROGRESS NOTE - CURRENT MEDICATION
MEDICATIONS  (STANDING):  benztropine 1 milliGRAM(s) Oral two times a day  nicotine -  14 mG/24Hr(s) Patch 1 Patch Transdermal daily  QUEtiapine 200 milliGRAM(s) Oral at bedtime  sodium chloride 1 Gram(s) Oral two times a day  valproic acid 750 milliGRAM(s) Oral two times a day    MEDICATIONS  (PRN):  acetaminophen     Tablet .. 650 milliGRAM(s) Oral every 6 hours PRN Moderate Pain (4 - 6)  haloperidol     Tablet 5 milliGRAM(s) Oral every 6 hours PRN agitation  ibuprofen  Tablet. 600 milliGRAM(s) Oral every 6 hours PRN Severe Pain (7 - 10)  nicotine  Polacrilex Lozenge 2 milliGRAM(s) Oral every 2 hours PRN NRT

## 2023-10-31 NOTE — BH INPATIENT PSYCHIATRY PROGRESS NOTE - NSTXIMPULSDATENEW_PSY_ALL_CORE
01-Nov-2023

## 2023-10-31 NOTE — BH INPATIENT PSYCHIATRY PROGRESS NOTE - NSBHMSEPERCEPT_PSY_A_CORE
Auditory hallucinations

## 2023-10-31 NOTE — BH INPATIENT PSYCHIATRY PROGRESS NOTE - NSBHCONSBHPROVDETAILS_PSY_A_CORE  FT
Will attempt to get in touch with patient's ACT team 
Yes have been in contact with several members of patient's ACT team 
Will attempt to get in touch with patient's ACT team 
Yes have been in contact with several members of patient's ACT team 
Will attempt to get in touch with patient's ACT team 
Yes have been in contact with several members of patient's ACT team 
Will attempt to get in touch with patient's ACT team 
Yes have been in contact with several members of patient's ACT team 
Yes have been in contact with several members of patient's ACT team 
Will attempt to get in touch with patient's ACT team 

## 2023-10-31 NOTE — BH INPATIENT PSYCHIATRY PROGRESS NOTE - NSBHFUPINTERVALHXFT_PSY_A_CORE
Chart and nursing notes reviewed. No acute events overnight. No new complaints. Patient calm, cooperative, adherent to standing medications. Patient stated he was excited for discharge. In good behavioral control, chronic delusions, internally preoccupied at times, and states he "sometimes" hears voices. He was noted yesterday during treatment meeting to have pretty good insight and judgment into his mental illness, he spoke to everyone and stated that he essentially believed the unit was helpful for patients, and he believed patient's would benefit more if they were adherent to their medications and more cooperative with their treatment team. He is asymptomatic from chronic hyponatremia. Prolixin 62.5 mg KIDD given on 10/25/23, he is next due for his Prolixin 62.5 mg KIDD on 11/8/23. Disorganized at times in his thought process/content, denies visual hallucinations, denies any suicidal or homicidal ideation, intent, or plan. Appears to be at his psychiatric baseline, his team is aware of planned discharge for Wednesday, 11/1/23. Medications sent to patient's preferred pharmacy.

## 2023-10-31 NOTE — BH INPATIENT PSYCHIATRY PROGRESS NOTE - NSBHASSESSSUMMFT_PSY_ALL_CORE
52 yo male, domiciled at residence, followed by [facility], with PPH of schizoaffective disorder on prolixin 62.5 mg given on 10/09 per the ED collateral at [facility]. He was brought to the ED after being found going through maillboxes and demonstrating disorganized behavior out in the community. On assessment today, notable symptoms included intermittent auditory hallucinations and, yesterday, the patient reported paranoia and delusional content including the belief that his food was poisoned, as well as mild agitation. He persistently denies SI/HI, intent and plan. He declined to provide any details on further assessment. He reports no other acute complaints at this time. He has not been a behavioral issue on the unit with no aggressive behaviors.     Plan:  # Schizoaffective disorder, bipolar type   - Continue Depakene 750 mg orally twice daily for mood stabilization; repeat level for 10/30/23 in am  - Continue with Seroquel 200 mg orally nightly  - Continue with Cogentin 1 mg orally BID for EPS/acute dystonia   - Depakote level 10/25/23 in am was 49.5 (sub-therapeutic); 10/30/23, 56.3   - Prolixin 62.5 mg KIDD given on 10/25/23; patient is next due for maintenance dose KIDD [id number] on 11/8/23   - Zyprexa/Ativan orally PRN for agitation and anxiety respectively   - Patient planned for discharge early 11/1/23; treatment team is in contact with patient's ACT team   - Legal status 9.27     #Nicotine dependence   - Continue with 14 mg patch q24H     #Chronic asymptomatic hyponatremia  - Start at home dose of 1 g sodium chloride, patient Na consistently 130, patient asymptomatic    #Left ankle swelling  - Xrays ordered, resulted   - Ortho recs appreciated

## 2023-10-31 NOTE — BH INPATIENT PSYCHIATRY PROGRESS NOTE - NSTXIMPULSGOAL_PSY_ALL_CORE
Will be able to demonstrate the ability to pause before acting out negatively
Other...
Will be able to demonstrate the ability to pause before acting out negatively
Other...
Other...
Will be able to demonstrate the ability to pause before acting out negatively

## 2023-10-31 NOTE — BH INPATIENT PSYCHIATRY PROGRESS NOTE - NSBHATTESTCOMMENTATTENDFT_PSY_A_CORE
Schizoaffective disorder. Pt has ACT Team. Suspect he may not be far from baseline. Mild paranoia with some internal preoccupation but cheerful. Will try to obtain collateral from ACT Team. 
Pt undergoing depakote uptitration and will find out when he is next due for prolixin dec. 
X ray of L ankle showed no fracture but significant swelling with possible exophytes and old fragmentation. Ortho consulted. Still need depakote level. Pt for discharge some time next week. 
Discussed the significance of his many tattoos with me including his [employer] tattoo. Prolixin dec and depakote. Pt is still OK with being on the unit. 
Follow up depakote level on Friday. Attending groups. Polite and thankful with team. 
54 yo male, domiciled at residence, followed by [facility], with PPH of schizoaffective disorder on prolixin 62.5 mg given on 10/09 per the ED collateral at [facility]. Pt responding well to current medications, will continue. 
For discharge on Wednesday. Sodium at 127. Medicine consulted. Pt has chronic hyponatremia which will need to be followed while on depakote. Pt takes salt tablets. Med recs appeciated. 
Suspect pt of cheeking so depakote switched to depakene. Orthos recs appreciated. 
Improved on depakote and prolixin dec. Eager for discharge next week. Team has spoken with ACT Team. Ortho has no further recs for swollen L ankle.

## 2023-10-31 NOTE — BH INPATIENT PSYCHIATRY PROGRESS NOTE - NSTXIMPULSPROGRES_PSY_ALL_CORE
No Change
Met - goal discontinued
No Change
Improving
Improving
No Change
Met - goal discontinued
Improving
Met - goal discontinued

## 2023-10-31 NOTE — BH INPATIENT PSYCHIATRY PROGRESS NOTE - NSBHMSEAFFQUAL_PSY_A_CORE
Euthymic
Irritable
Euthymic
Irritable

## 2023-10-31 NOTE — BH INPATIENT PSYCHIATRY PROGRESS NOTE - NSTXCOPEDATETRGT_PSY_ALL_CORE
23-Oct-2023
31-Oct-2023
23-Oct-2023
21-Oct-2023
31-Oct-2023
23-Oct-2023
31-Oct-2023
24-Oct-2023
31-Oct-2023
23-Oct-2023
31-Oct-2023
24-Oct-2023
23-Oct-2023

## 2023-10-31 NOTE — BH INPATIENT PSYCHIATRY PROGRESS NOTE - NSBHMETABOLICLABS_PSY_ALL_CORE
Labs within last 12 months
All labs not within last 12 months, not ordered

## 2023-10-31 NOTE — BH INPATIENT PSYCHIATRY PROGRESS NOTE - NSBHFUPMEDSE_PSY_A_CORE
None known
no
None known
None known

## 2023-11-01 VITALS
DIASTOLIC BLOOD PRESSURE: 79 MMHG | RESPIRATION RATE: 18 BRPM | TEMPERATURE: 98 F | OXYGEN SATURATION: 97 % | HEART RATE: 67 BPM | SYSTOLIC BLOOD PRESSURE: 117 MMHG

## 2023-11-01 PROCEDURE — ZZZZZ: CPT

## 2023-11-01 RX ADMIN — Medication 750 MILLIGRAM(S): at 10:17

## 2023-11-01 RX ADMIN — Medication 600 MILLIGRAM(S): at 11:35

## 2023-11-01 RX ADMIN — Medication 1 MILLIGRAM(S): at 10:18

## 2023-11-01 RX ADMIN — Medication 600 MILLIGRAM(S): at 10:48

## 2023-11-01 RX ADMIN — SODIUM CHLORIDE 1 GRAM(S): 9 INJECTION INTRAMUSCULAR; INTRAVENOUS; SUBCUTANEOUS at 10:18

## 2023-11-01 RX ADMIN — Medication 2 MILLIGRAM(S): at 10:18

## 2023-11-01 NOTE — BH DISCHARGE NOTE NURSING/SOCIAL WORK/PSYCH REHAB - NSDCPRGOAL_PSY_ALL_CORE
Pt attended select groups throughout admission.  Pt was oddly-related throughout admission and regularly appeared internally preoccupied and would reply to internal stimuli.  Pt has demonstrated wide range of affect and would often laugh and smile to himself out of context.  Pt has been irritable and demonstrated paranoia at times but has been calm in the last week of admission.  Pt has been social with select peers.  Pt endorsed readiness for discharge and completed a safety plan.

## 2023-11-01 NOTE — BH INPATIENT PSYCHIATRY DISCHARGE NOTE - OTHER PAST PSYCHIATRIC HISTORY (INCLUDE DETAILS REGARDING ONSET, COURSE OF ILLNESS, INPATIENT/OUTPATIENT TREATMENT)
54 yo male, domiciled at residence, followed by [facility], with PPH of schizoaffective disorder on prolixin 62.5 mg given on 10/09 per the ED collateral at [facility]. He was brought to the ED after being found going through maillboxes and demonstrating disorganized behavior out in the community. On assessment today, notable symptoms included intermittent auditory hallucinations and, yesterday, the patient reported paranoia and delusional content including the belief that his food was poisoned, as well as mild agitation. He persistently denies SI/HI, intent and plan. He declined to provide any details on further assessment. He reports no other acute complaints at this time. He has not been a behavioral issue on the unit with no aggressive behaviors.

## 2023-11-01 NOTE — BH INPATIENT PSYCHIATRY DISCHARGE NOTE - HOSPITAL COURSE
Initially while on the unit patient was extremely disorganized in his thought process/content, experiencing auditory hallucinations and was irritable. He was however, entirely cooperative and engaging with the treatment team. He remained fully adherent to standing medications, with no reported adverse effects. His ACT team was contacted, and his regimen was continued. Patient was continued on Depakote 500 mg orally twice daily, and it was eventually titrated up to 750 mg orally twice daily, after his level was found to be subtherapeutic. His Prolixin Dec 62.5 mg KIDD was given on 10/24/23, he is next due for it on 11/8/23. Patient was also started on Seroquel 200 mg orally nightly for disorganization and psychosis as well as its benefits on sleep, with good effect and patient tolerated the medication well. He remained in good behavioral control entirety of his admission. Medicine was consulted for his chronic hyponatremia which remained asymptomatic during admission, and they recommended increasing his NaCl tablets to 1 g orally twice daily, with good effect. 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 At time of discharge, patient showed significant improvement in psychiatric symptoms, as evidenced by decreased disorganization and psychotic symptoms, keeping up with ADLs, better related, getting along with staff and peers, and actively engaging in groups. He showed improvement in his insight and judgement as evidenced by Meghana walsh full adherence to his medication, and being involved in his aftercare planning. His baseline chronic delusions and auditory delusions were present but he was better related, and more linear. ACT team and primary team coordinated discharge for patient for 11/1/23. Initially while on the unit patient was extremely disorganized in his thought process/content, experiencing auditory hallucinations and was irritable. He was however, entirely cooperative and engaging with the treatment team. He remained fully adherent to standing medications, with no reported adverse effects. His ACT team was contacted, and his regimen was continued. Patient was continued on Depakote 500 mg orally twice daily, and it was eventually titrated up to 750 mg orally twice daily, after his level was found to be subtherapeutic. His Prolixin Dec 62.5 mg KIDD was given on 10/24/23, he is next due for it on 11/8/23. Patient was also started on Seroquel 200 mg orally nightly for disorganization and psychosis as well as its benefits on sleep, with good effect and patient tolerated the medication well. He remained in good behavioral control entirety of his admission. Medicine was consulted for his chronic hyponatremia which remained asymptomatic during admission, and they recommended increasing his NaCl tablets to 1 g orally twice daily, with good effect. 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 At time of discharge, patient showed significant improvement in psychiatric symptoms, as evidenced by decreased disorganization and psychotic symptoms, keeping up with ADLs, better related, getting along with staff and peers, and actively engaging in groups. He showed improvement in his insight and judgement as evidenced by Meghana walsh full adherence to his medication, and being involved in his aftercare planning. His baseline chronic delusions and auditory delusions were present but he was better related, and more linear. ACT team and primary team coordinated discharge for patient for 11/1/23.    Current medications acetaminophen    benztropine 1 milliGRAM(s) Oral two times a day to prevent eps   nicotine -  14 mG/24Hr(s) Patch 1 Patch Transdermal daily  should not be given given that patient is planning to smoke   QUEtiapine 200 milliGRAM(s) Oral at bedtime  for mood  one month supply   sodium chloride 1 Gram(s) Oral two times a day for mineral balance  valproic acid 750 milliGRAM(s) Oral two times a day for mood stability one month supply     Take until changed or discontinued by a qualified health professional.

## 2023-11-01 NOTE — BH INPATIENT PSYCHIATRY DISCHARGE NOTE - ATTENDING DISCHARGE PHYSICAL EXAMINATION:
;;11/01: Future oriented; looks forward to smoking cigars; warned about using nrts with cigarettes and cigars ; agrees; Not endorsing  suicidal or homicidal ideation intent or plans; no mention of auditory or visual hallucinations ; a bit elevated mood but not pressured or irritable; i&j fair to poor : aware of medications and acknowledges symptoms but little reflectiveness in a meaningful way  on issues that impact on symptoms.  Alert; oriented; cognition intact; speech clear; no tremor or evidence of movement impairment.

## 2023-11-01 NOTE — BH TREATMENT PLAN - NSTXCOPEINTERRN_PSY_ALL_CORE
Provide opportunity for expression of feelings, perceptions and stressors. Assist with accurate evaluation of situation; encourage focus on the present.
Provide opportunity for expression of feelings, perceptions and stressors. Assist with accurate evaluation of situation; encourage focus on the present.

## 2023-11-01 NOTE — BH DISCHARGE NOTE NURSING/SOCIAL WORK/PSYCH REHAB - PATIENT PORTAL LINK FT
You can access the FollowMyHealth Patient Portal offered by [facility] by registering at the following website: [email or web address]. By joining [facility]’s FollowMyHealth portal, you will also be able to view your health information using other applications (apps) compatible with our system.

## 2023-11-01 NOTE — BH TREATMENT PLAN - NSTXCOPEINTERPR_PSY_ALL_CORE
Pt. will continue to be invited and encouraged to attend all offered groups.
Pt will continue to be invited and encouraged to attend all offered groups

## 2023-11-01 NOTE — BH TREATMENT PLAN - NSDCCRITERIA_PSY_ALL_CORE
able to care for self outside of the hospital, decreased psychotic content, not endorsing any HI/SI, safe dispo 
able to care for self outside of the hospital, decreased psychotic content, not endorsing any HI/SI, safe dispo

## 2023-11-01 NOTE — BH TREATMENT PLAN - NSTXPSYCHOINTERRN_PSY_ALL_CORE
Administer medication as needed. Provide reality based reorientation. Maintain medication compliance.
patient is appropriate and cooperative,understand instructions and able to maintain good control.

## 2023-11-01 NOTE — BH DISCHARGE NOTE NURSING/SOCIAL WORK/PSYCH REHAB - NSCDUDCCRISIS_PSY_A_CORE
[facility]  [phone] [facility]-WELL ([phone])  Text "WELL" to [phone]  Website: [email or web address]  [facility] for Children, Adults and Families  [address] [place] [address]  Mobile Crisis Hotline – (135) 298-8390/.  [facility]  [phone]/.  [facility]  [phone] [place] [address] [phone]   Hours:  Monday through Friday from 9 AM to 3 PM/[facility]

## 2023-11-01 NOTE — BH TREATMENT PLAN - NSCMSPTSTRENGTHS_PSY_ALL_CORE
Able to adapt/Compliance to treatment/Jenn/spirituality/Physically healthy
Able to adapt/Compliance to treatment/Jenn/spirituality/Physically healthy

## 2023-11-01 NOTE — BH INPATIENT PSYCHIATRY DISCHARGE NOTE - NSDCMRMEDTOKEN_GEN_ALL_CORE_FT
benztropine 1 mg oral tablet: 1 tab(s) orally 2 times a day  QUEtiapine 200 mg oral tablet: 1 tab(s) orally once a day (at bedtime)  sodium chloride 1 g oral tablet: 1 tab(s) orally 2 times a day  valproic acid 250 mg oral capsule: 3 cap(s) orally 2 times a day

## 2023-11-01 NOTE — BH INPATIENT PSYCHIATRY DISCHARGE NOTE - HPI (INCLUDE ILLNESS QUALITY, SEVERITY, DURATION, TIMING, CONTEXT, MODIFYING FACTORS, ASSOCIATED SIGNS AND SYMPTOMS)
54 yo male, domiciled at residence, followed by [facility], with PPH of schizoaffective disorder on prolixin 62.5 mg given on 10/09 per the ED collateral at [facility]. He was brought to the ED after being found going through maillboxes and demonstrating disorganized behavior out in the community. He was admitted due to decompensation. On assessment today, the patient was notably paranoid, frequently stating that he wanted to "keep those things private." He expressed that he felt the hospital was "poisoning the food in the morning." He reported having a "good appetite and good mood." He declined to comment on any other mood symptoms and also refused to mention how much sleep he has been getting recently. He denies any access to guns, denies any SI or HI. He reports smoking 2-3 cigars daily and denies all other substance use. He declined all other questions and reported mild side effects to haldol, artane, and inderal. It appears related to EPS and dizziness.

## 2023-11-01 NOTE — BH INPATIENT PSYCHIATRY DISCHARGE NOTE - NSBHASSESSSUMMFT_PSY_ALL_CORE
52 yo male, domiciled at residence, followed by [facility], with PPH of schizoaffective disorder on prolixin 62.5 mg given on 10/09 per the ED collateral at [facility]. He was brought to the ED after being found going through maillboxes and demonstrating disorganized behavior out in the community. On assessment today, notable symptoms included intermittent auditory hallucinations and, yesterday, the patient reported paranoia and delusional content including the belief that his food was poisoned, as well as mild agitation. He persistently denies SI/HI, intent and plan. He declined to provide any details on further assessment. He reports no other acute complaints at this time. He has not been a behavioral issue on the unit with no aggressive behaviors. Presentation was consistent with decompensatepsychosis likely secondary to substance use. His presentation improved with increasing Depakote dose and with addition of quetiapine. Pt is safe and appropriate for discharge.

## 2023-11-01 NOTE — BH DISCHARGE NOTE NURSING/SOCIAL WORK/PSYCH REHAB - NSDCADDINFO2FT_PSY_ALL_CORE
Intake appointment scheduled Friday, 11/3 at 1:30pm IN PERSON. Please bring insurance card and photo ID.

## 2023-11-01 NOTE — BH TREATMENT PLAN - NSTXIMPULSINTERRN_PSY_ALL_CORE
Provide therapeutic communication. Administer medications as needed. Determine efficacy of current coping mechanisms.
Provide therapeutic communication. Administer medications as needed. Determine efficacy of current coping mechanisms.

## 2023-11-01 NOTE — BH TREATMENT PLAN - NSTXCOPEGOALOTHER_PSY_ALL_CORE
Pt. will participate in groups and the milieu treatment structure of the unit.
Pt will participate in groups and milieu treatment structure of the unit

## 2023-11-01 NOTE — BH TREATMENT PLAN - NSTXPLANTHERAPYSESSIONSFT_PSY_ALL_CORE
10-19-23  Type of therapy: Other, Self & Others  Type of session: Group  Level of patient participation: Participates  Duration of participation: 45 minutes  Therapy conducted by: Psych rehab  Therapy Summary: Pt attended Self & Others group and was able to identify a number of relationships with family he wants to improve, more notably with his mother and father. Pt spoke about using language in the past that upset his father, but feels he has been forgiven for this. Pt presents as pleasant and somewhat disorganized, and was noted softly laughing to himself several times during the group. Pt will continue to be invited and encouraged to attend all offered, self selected groups.  
  10-19-23  Type of therapy: Other, Self & Others  Type of session: Group  Level of patient participation: Participates  Duration of participation: 45 minutes  Therapy conducted by: Psych rehab  Therapy Summary: Pt attended Self & Others group and was able to identify a number of relationships with family he wants to improve, more notably with his mother and father. Pt spoke about using language in the past that upset his father, but feels he has been forgiven for this. Pt presents as pleasant and somewhat disorganized, and was noted softly laughing to himself several times during the group. Pt will continue to be invited and encouraged to attend all offered, self selected groups.

## 2023-11-01 NOTE — BH INPATIENT PSYCHIATRY DISCHARGE NOTE - NSDCPROCEDURESFT_PSY_ALL_CORE
Sodium: 127 mmol/L (10.30.23 @ 05:30)  corrected with oral sodium chloride  Valproic Acid Level, Serum (10.30.23 @ 05:30)   Valproic Acid Level, Serum: 56.3 ug/mL  low therapeutic     Regarding Seroquel  Advise that as per FDA patient receive an  eye examination in six months of Seroquel use.  (Seroquel has been associated with  cataracts in beagle dogs during certification process.)

## 2023-11-01 NOTE — BH DISCHARGE NOTE NURSING/SOCIAL WORK/PSYCH REHAB - NSBHREFERPURPOSE1_PSY_ALL_CORE
Pt states pain in right hip that is chronic but states it is worse over the past two weeks and radiating into thigh, knee and calf. Denies new injury to the area. States she has needed a hip replacement.
[facility]

## 2023-11-01 NOTE — BH INPATIENT PSYCHIATRY DISCHARGE NOTE - NSBHMETABOLIC_PSY_ALL_CORE_FT
BMI: BMI (kg/m2): 29 (10-14-23 @ 04:31)  HbA1c: A1C with Estimated Average Glucose Result: 5.2 % (10-17-23 @ 11:13)    Glucose:   BP: 152/86 (10-31-23 @ 17:01) (112/74 - 152/86)  Lipid Panel: Date/Time: 10-17-23 @ 11:13  Cholesterol, Serum: 165  Direct LDL: --  HDL Cholesterol, Serum: 58  Total Cholesterol/HDL Ration Measurement: --  Triglycerides, Serum: 93   BMI: BMI (kg/m2): 29 (10-14-23 @ 04:31) ;HbA1c: A1C with Estimated Average Glucose Result: 5.2 % (10-17-23 @ 11:13) ; ;Glucose: ;BP: 152/86 (10-31-23 @ 17:01) (112/74 - 152/86) ;Lipid Panel: Date/Time: 10-17-23 @ 11:13 ;Cholesterol, Serum: 165 ;Direct LDL: -- Lipid Profile with Reflex LDL Cholesterol, Direct (10.17.23 @ 11:13) ; Cholesterol: 165 mg/dL ; HDL Cholesterol: 58 mg/dL ; Triglycerides, Serum: 93 mg/dL ; Non HDL Cholesterol: 107: Patients Atherosclerotic Cardiovascular Disease (ASCVD) Risk ;Optimal Level (mg/dL) ;LDL Cholesterol (LDL-C) ;All Patients < 100 ;ASCVD at Very High Risk{1} < 70 ;Non-HDL Cholesterol (Non-HDL-C) ;All Patients < 130 ;ASCVD at Very High Risk{1} < 100 ;Non-HDL-Cholesterol (Non-HDL-C) is also a key target for cardiovascular ;risk reduction. ;Consider Familial Hypercholesterolemia when: LDL-C > 190 mg/dL or ;Non-HDL-C > 220 mg/dL. ;LDL-C calculation using the Friedewald equation is not provided when ;triglycerides > 400 mg/dL, in which case we recommend repeating the test ;after fasting, if it was not done before. ;When triglycerides >150 mg/dL, calculated LDL-C is provided but maystill ;be inaccurate (particularly when LDL-C < 70 mg/dL). It can be ;recalculated off-line using other equations (e.g. Renan SS, Nancy MJ, ;Jenny GRIMALDO, et al.WLIL 2013;310:2061 - 8). ;{1} Silas Wu.,et al. "2019 AHA/ACC. . . guideline on the ;management of blood cholesterol: a report of the American College of ;Cardiology/American Heart Association Task Force on Clinical Practice ;Guidelines." Circulation;139:e1082 - e1143. ;These values apply only to persons 20 years and older. ;Lipid Panel updated with new test, reference ranges and interpretive ;comments effective 10-. mg/dL ; LDL Cholesterol Calculated: 88 mg/dL ; ;HDL Cholesterol, Serum: 58 ;Total Cholesterol/HDL Ration Measurement: -- ;Triglycerides, Serum: 93 ;  Lipid Profile with Reflex LDL Cholesterol, Direct (10.17.23 @ 11:13)    Cholesterol: 165 mg/dL    HDL Cholesterol: 58 mg/dL    Triglycerides, Serum: 93 mg/dL    Non HDL Cholesterol: 107: Patients Atherosclerotic Cardiovascular Disease (ASCVD) Risk  Optimal Level (mg/dL)  LDL Cholesterol (LDL-C)  All Patients                                < 100  ASCVD at Very High Risk{1}    < 70  Non-HDL Cholesterol (Non-HDL-C)  All Patients                       < 130  ASCVD at Very High Risk{1}   < 100  Non-HDL-Cholesterol (Non-HDL-C) is also a key target for cardiovascular  risk reduction.  Consider Familial Hypercholesterolemia when: LDL-C > 190 mg/dL or  Non-HDL-C > 220 mg/dL.  LDL-C calculation using the Friedewald equation is not provided when  triglycerides > 400 mg/dL, in which case we recommend repeating the test  after fasting, if it was not done before.  When triglycerides >150 mg/dL, calculated LDL-C is provided but maystill  be inaccurate (particularly when LDL-C < 70 mg/dL). It can be  recalculated off-line using other equations (e.g. Renan SS, Nancy MJ,  Jenny MB, et al.WILL 2013;310:2061 - 8).  {1} Silas Wu,et al. "2019 AHA/ACC. . . guideline on the  management of blood cholesterol: a report of the American College of  Cardiology/American Heart Association Task Force on Clinical Practice  Guidelines." Circulation;139:e1082 - e1143.  These values apply only to persons 20 years and older.  Lipid Panel updated with new test, reference ranges and interpretive  comments effective 10-. mg/dL    LDL Cholesterol Calculated: 88 mg/dL

## 2023-11-01 NOTE — BH TREATMENT PLAN - NSTXIMPULSINTERMD_PSY_ALL_CORE
Depakote, Seroquel, Prolixin, Cogentin   Depakote subtherapeutic, dose increased and switched to depakene to ensure patient is not diverting medications 
Depakote, Seroquel, Prolixin, Cogentin   Depakote subtherapeutic, dose increased and switched to depakene to ensure patient is not diverting medications

## 2023-11-01 NOTE — BH INPATIENT PSYCHIATRY DISCHARGE NOTE - REASON FOR ADMISSION
Mr. Mark is a 52 yo male, domiciled at residence, followed by [facility], with PPH of schizoaffective disorder on prolixin 62.5 mg given on 10/09 per the ED collateral at [facility]. He was brought to the ED after being found going through mailboxes and demonstrating disorganized behavior out in the community. He was admitted due to decompensation. On initial presentation, the patient was notably paranoid, frequently stating that he wanted to "keep those things private." He expressed that he felt the hospital was "poisoning the food in the morning." He reported having a "good appetite and good mood." He declined to comment on any other mood symptoms and also refused to mention how much sleep he had been getting recently. He denied any access to guns, denied any SI or HI. He reported smoking 2-3 cigars daily and denies all other substance use. He declined all other questions and reported mild side effects to haldol, artane, and inderal, likely related to EPS and dizziness

## 2023-11-02 NOTE — BH SOCIAL WORK CONFIRMATION FOLLOW UP NOTE - NSCOMMENTS_PSY_ALL_CORE
Caring Call 11/2/23: Pt low SI risk while admitted thus, no call needed.    Linkage Call 11/2/23: SW spoke with Marleni from pts [facility] team who stated they weren't able to make successful contact on 11/1. Team currently in the field and will attempt to meet with pt today. SW to follow up.     [facility] ([facility])  01-Nov-2023 14:00  [address] [place] [address]  [phone]   Caring Call 11/2/23: Pt low SI risk while admitted thus, no call needed.    Linkage Call 11/2/23: SW spoke with Marleni from pts [facility] team who stated they weren't able to make successful contact on 11/1. Team currently in the field and will attempt to meet with pt today. SW to follow up. 11/3: SW followed up with ACT team and spoke with Marleni who stated they were able to meet with pt in the field. No concerns noted.        Caring Call 11/2/23: Pt low SI risk while admitted thus, no call needed.    Linkage Call 11/2/23: SW spoke with Marleni from pts [facility] team who stated they weren't able to make successful contact on 11/1. Team currently in the field and will attempt to meet with pt today. SW to follow up. 11/3: SW followed up with ACT team and spoke with Marleni who stated they were able to meet with pt in the field. They arranged Medicaid cab and encouraged pt to attend appointment below to address substance use concerns.     [facility]  03-Nov-2023 13:30  [address], [address], [place] [id number] [phone]

## 2023-11-02 NOTE — BH SOCIAL WORK CONFIRMATION FOLLOW UP NOTE - NSLINKEDTOLOC_PSY_ALL_CORE
[facility]  [address] [place] [address], [facility]  [phone]

## 2023-11-06 DIAGNOSIS — Z72.0 TOBACCO USE: ICD-10-CM

## 2023-11-06 DIAGNOSIS — F25.0 SCHIZOAFFECTIVE DISORDER, BIPOLAR TYPE: ICD-10-CM

## 2023-11-06 DIAGNOSIS — E87.1 HYPO-OSMOLALITY AND HYPONATREMIA: ICD-10-CM

## 2023-11-06 DIAGNOSIS — F29 UNSPECIFIED PSYCHOSIS NOT DUE TO A SUBSTANCE OR KNOWN PHYSIOLOGICAL CONDITION: ICD-10-CM

## 2023-11-06 DIAGNOSIS — M25.572 PAIN IN LEFT ANKLE AND JOINTS OF LEFT FOOT: ICD-10-CM

## 2023-11-13 PROCEDURE — 80061 LIPID PANEL: CPT

## 2023-11-13 PROCEDURE — 73610 X-RAY EXAM OF ANKLE: CPT

## 2023-11-13 PROCEDURE — 85027 COMPLETE CBC AUTOMATED: CPT

## 2023-11-13 PROCEDURE — 85025 COMPLETE CBC W/AUTO DIFF WBC: CPT

## 2023-11-13 PROCEDURE — 83036 HEMOGLOBIN GLYCOSYLATED A1C: CPT

## 2023-11-13 PROCEDURE — 80053 COMPREHEN METABOLIC PANEL: CPT

## 2023-11-13 PROCEDURE — 36415 COLL VENOUS BLD VENIPUNCTURE: CPT

## 2023-11-13 PROCEDURE — 80164 ASSAY DIPROPYLACETIC ACD TOT: CPT
